# Patient Record
Sex: FEMALE | Race: WHITE | NOT HISPANIC OR LATINO | Employment: FULL TIME | URBAN - METROPOLITAN AREA
[De-identification: names, ages, dates, MRNs, and addresses within clinical notes are randomized per-mention and may not be internally consistent; named-entity substitution may affect disease eponyms.]

---

## 2017-01-16 ENCOUNTER — TRANSFERRED RECORDS (OUTPATIENT)
Dept: HEALTH INFORMATION MANAGEMENT | Facility: CLINIC | Age: 42
End: 2017-01-16

## 2017-01-16 LAB — PAP-ABSTRACT: NORMAL

## 2017-07-15 ENCOUNTER — HEALTH MAINTENANCE LETTER (OUTPATIENT)
Age: 42
End: 2017-07-15

## 2018-01-20 ENCOUNTER — HEALTH MAINTENANCE LETTER (OUTPATIENT)
Age: 43
End: 2018-01-20

## 2018-07-22 ENCOUNTER — HEALTH MAINTENANCE LETTER (OUTPATIENT)
Age: 43
End: 2018-07-22

## 2018-07-23 ENCOUNTER — OFFICE VISIT (OUTPATIENT)
Dept: DERMATOLOGY | Facility: CLINIC | Age: 43
End: 2018-07-23
Payer: COMMERCIAL

## 2018-07-23 VITALS — SYSTOLIC BLOOD PRESSURE: 144 MMHG | DIASTOLIC BLOOD PRESSURE: 74 MMHG | OXYGEN SATURATION: 96 % | HEART RATE: 61 BPM

## 2018-07-23 DIAGNOSIS — L82.1 SEBORRHEIC KERATOSIS: ICD-10-CM

## 2018-07-23 DIAGNOSIS — D22.9 NEVUS: Primary | ICD-10-CM

## 2018-07-23 DIAGNOSIS — L81.4 LENTIGO: ICD-10-CM

## 2018-07-23 DIAGNOSIS — D18.00 ANGIOMA: ICD-10-CM

## 2018-07-23 PROCEDURE — 99203 OFFICE O/P NEW LOW 30 MIN: CPT | Performed by: PHYSICIAN ASSISTANT

## 2018-07-23 NOTE — MR AVS SNAPSHOT
After Visit Summary   7/23/2018    Nila Starks    MRN: [id number]           Patient Information     Date Of Birth          1975        Visit Information        Provider Department      7/23/2018 10:30 AM Diane Castillo PA-C [facility]        Today's Diagnoses     Nevus    -  1    Lentigo        Angioma        Seborrheic keratosis           Follow-ups after your visit        Who to contact     If you have questions or need follow up information about today's clinic visit or your schedule please contact [facility] directly at [phone].  Normal or non-critical lab and imaging results will be communicated to you by MyChart, letter or phone within 4 business days after the clinic has received the results. If you do not hear from us within 7 days, please contact the clinic through MyChart or phone. If you have a critical or abnormal lab result, we will notify you by phone as soon as possible.  Submit refill requests through [employer] or call your pharmacy and they will forward the refill request to us. Please allow 3 business days for your refill to be completed.          Additional Information About Your Visit        Care EveryWhere ID     This is your Care EveryWhere ID. This could be used by other organizations to access your [facility] medical records  [id number]        Your Vitals Were     Pulse Pulse Oximetry                61 96%           Blood Pressure from Last 3 Encounters:   07/23/18 144/74   12/13/07 129/70   02/13/07 110/70    Weight from Last 3 Encounters:   12/13/07 95.3 kg (210 lb)   02/13/07 87.7 kg (193 lb 6.4 oz)   09/07/06 94.8 kg (209 lb)              Today, you had the following     No orders found for display       Primary Care Provider Office Phone # Fax #    Vianey Johana Penaloza [phone]       [address] [place] [address]        Equal Access to Services     JESSIE ELLIOTT : Hoda Funes,  waedisonraeann izaguirre, qaybta kadakotah prieto, andrea lissetsu mcdaniel tanolotus laKatieaahenrry ah. So [facility] [phone].    ATENCIÓN: Si rjla parker, tiene a denton disposición servicios gratuitos de asistencia lingüística. [place] [facility] [phone].    We comply with applicable federal civil rights laws and [place] laws. We do not discriminate on the basis of race, color, national origin, age, disability, sex, sexual orientation, or gender identity.            Thank you!     Thank you for choosing [facility]  for your care. Our goal is always to provide you with excellent care. Hearing back from our patients is one way we can continue to improve our services. Please take a few minutes to complete the written survey that you may receive in the mail after your visit with us. Thank you!             Your Updated Medication List - Protect others around you: Learn how to safely use, store and throw away your medicines at www.disposemymeds.org.          This list is accurate as of 7/23/18  1:53 PM.  Always use your most recent med list.                   Brand Name Dispense Instructions for use Diagnosis    MUCINEX D  MG per 12 hr tablet   Generic drug:  pseudoePHEDrine-guaiFENesin     28    1 TABLET EVERY 12 HOURS AS NEEDED    Acute upper respiratory infections of unspecified site       NO ACTIVE MEDICATIONS     0    .        VITAMIN E BLEND PO      Take 100 Units by mouth

## 2018-07-23 NOTE — LETTER
7/23/2018         RE: Nila Starks  [address]  [facility] [address]        Dear Colleague,    Thank you for referring your patient, Nila Starks, to the [facility]. Please see a copy of my visit note below.    HPI:   Nila Starks is a 43 year old female who presents for Full skin cancer screening.  chief complaint  Last Skin Exam: n/a      1st Baseline: yes  Personal HX of Skin Cancer: no   Personal HX of Malignant Melanoma: no   Family HX of Skin Cancer / Malignant Melanoma: no  Personal HX of Atypical Moles:   no  Risk factors: history of sun exposure and sunburns  New / Changing lesions:yes irritated spot on the back  Social History:   On review of systems, there are no further skin complaints, patient is feeling otherwise well.  See patient intake sheet.  ROS of the following were done and are negative: Constitutional, Eyes, Ears, Nose,   Mouth, Throat, Cardiovascular, Respiratory, GI, Genitourinary, Musculoskeletal,   Psychiatric, Endocrine, Allergic/Immunologic.    PHYSICAL EXAM:   /74  Pulse 61  SpO2 96%  Skin exam performed as follows: Type 2 skin. Mood appropriate  Alert and Oriented X 3. Well developed, well nourished in no distress.  General appearance: Normal  Head including face: Normal  Eyes: conjunctiva and lids: Normal  Mouth: Lips, teeth, gums: Normal  Neck: Normal  Chest-breast/axillae: Normal  Back: Normal  Spleen and liver: Normal  Cardiovascular: Exam of peripheral vascular system by observation for swelling, varicosities, edema: Normal  Genitalia: groin, buttocks: Normal  Extremities: digits/nails (clubbing): Normal  Eccrine and Apocrine glands: Normal  Right upper extremity: Normal  Left upper extremity: Normal  Right lower extremity: Normal  Left lower extremity: Normal  Skin: Scalp and body hair: See below    Pt deferred exam of breasts, groin, buttocks: No    Other physical findings:  1. Multiple pigmented macules on extremities and trunk  2.  Multiple pigmented macules on face, trunk and extremities  3. Multiple vascular papules on trunk, arms and legs  4. Multiple scattered keratotic plaques       Except as noted above, no other signs of skin cancer or melanoma.     ASSESSMENT/PLAN:   Benign Full skin cancer screening today. . Patient with history of none  Advised on monthly self exams and 1 year  Patient Education: Appropriate brochures given.    Multiple benign appearing nevi on arms, legs and trunk. Discussed ABCDEs of melanoma and sunscreen.   Multiple lentigos on arms, legs and trunk. Advised benign, no treatment needed.  Multiple scattered angiomas. Advised benign, no treatment needed.   Seborrheic keratosis on arms, legs and trunk. Advised benign, no treatment needed.  Patient to follow up with Primary Care provider regarding elevated blood pressure.      Follow-up: yearly FSE/PRN sooner    1.) Patient was asked about new and changing moles. YES  2.) Patient received a complete physical skin examination: YES  3.) Patient was counseled to perform a monthly self skin examination: YES  Scribed By: Diane Castillo, MS, PADuranC      Again, thank you for allowing me to participate in the care of your patient.        Sincerely,        Diane Castillo PA-C

## 2018-07-23 NOTE — PROGRESS NOTES
HPI:   Nila Starks is a 43 year old female who presents for Full skin cancer screening.  chief complaint  Last Skin Exam: n/a      1st Baseline: yes  Personal HX of Skin Cancer: no   Personal HX of Malignant Melanoma: no   Family HX of Skin Cancer / Malignant Melanoma: no  Personal HX of Atypical Moles:   no  Risk factors: history of sun exposure and sunburns  New / Changing lesions:yes irritated spot on the back  Social History:   On review of systems, there are no further skin complaints, patient is feeling otherwise well.  See patient intake sheet.  ROS of the following were done and are negative: Constitutional, Eyes, Ears, Nose,   Mouth, Throat, Cardiovascular, Respiratory, GI, Genitourinary, Musculoskeletal,   Psychiatric, Endocrine, Allergic/Immunologic.    PHYSICAL EXAM:   /74  Pulse 61  SpO2 96%  Skin exam performed as follows: Type 2 skin. Mood appropriate  Alert and Oriented X 3. Well developed, well nourished in no distress.  General appearance: Normal  Head including face: Normal  Eyes: conjunctiva and lids: Normal  Mouth: Lips, teeth, gums: Normal  Neck: Normal  Chest-breast/axillae: Normal  Back: Normal  Spleen and liver: Normal  Cardiovascular: Exam of peripheral vascular system by observation for swelling, varicosities, edema: Normal  Genitalia: groin, buttocks: Normal  Extremities: digits/nails (clubbing): Normal  Eccrine and Apocrine glands: Normal  Right upper extremity: Normal  Left upper extremity: Normal  Right lower extremity: Normal  Left lower extremity: Normal  Skin: Scalp and body hair: See below    Pt deferred exam of breasts, groin, buttocks: No    Other physical findings:  1. Multiple pigmented macules on extremities and trunk  2. Multiple pigmented macules on face, trunk and extremities  3. Multiple vascular papules on trunk, arms and legs  4. Multiple scattered keratotic plaques       Except as noted above, no other signs of skin cancer or melanoma.     ASSESSMENT/PLAN:    Benign Full skin cancer screening today. . Patient with history of none  Advised on monthly self exams and 1 year  Patient Education: Appropriate brochures given.    Multiple benign appearing nevi on arms, legs and trunk. Discussed ABCDEs of melanoma and sunscreen.   Multiple lentigos on arms, legs and trunk. Advised benign, no treatment needed.  Multiple scattered angiomas. Advised benign, no treatment needed.   Seborrheic keratosis on arms, legs and trunk. Advised benign, no treatment needed.  Patient to follow up with Primary Care provider regarding elevated blood pressure.      Follow-up: yearly FSE/PRN sooner    1.) Patient was asked about new and changing moles. YES  2.) Patient received a complete physical skin examination: YES  3.) Patient was counseled to perform a monthly self skin examination: YES  Scribed By: Diane Castillo MS, PADuranC

## 2019-01-23 ENCOUNTER — OFFICE VISIT (OUTPATIENT)
Dept: FAMILY MEDICINE | Facility: CLINIC | Age: 44
End: 2019-01-23
Payer: COMMERCIAL

## 2019-01-23 ENCOUNTER — AMBULATORY - HEALTHEAST (OUTPATIENT)
Dept: SURGERY | Facility: CLINIC | Age: 44
End: 2019-01-23

## 2019-01-23 ENCOUNTER — COMMUNICATION - HEALTHEAST (OUTPATIENT)
Dept: SURGERY | Facility: CLINIC | Age: 44
End: 2019-01-23

## 2019-01-23 VITALS
OXYGEN SATURATION: 98 % | BODY MASS INDEX: 44.93 KG/M2 | WEIGHT: 238 LBS | TEMPERATURE: 99.5 F | DIASTOLIC BLOOD PRESSURE: 98 MMHG | HEART RATE: 85 BPM | SYSTOLIC BLOOD PRESSURE: 144 MMHG | HEIGHT: 61 IN | RESPIRATION RATE: 24 BRPM

## 2019-01-23 DIAGNOSIS — K21.9 GASTROESOPHAGEAL REFLUX DISEASE WITHOUT ESOPHAGITIS: ICD-10-CM

## 2019-01-23 DIAGNOSIS — R03.0 ELEVATED BLOOD PRESSURE READING WITHOUT DIAGNOSIS OF HYPERTENSION: ICD-10-CM

## 2019-01-23 DIAGNOSIS — E66.01 MORBID OBESITY (H): ICD-10-CM

## 2019-01-23 DIAGNOSIS — Z00.00 ROUTINE GENERAL MEDICAL EXAMINATION AT A HEALTH CARE FACILITY: Primary | ICD-10-CM

## 2019-01-23 DIAGNOSIS — R20.2 NUMBNESS AND TINGLING: ICD-10-CM

## 2019-01-23 DIAGNOSIS — R20.0 NUMBNESS AND TINGLING: ICD-10-CM

## 2019-01-23 DIAGNOSIS — G47.33 OSA (OBSTRUCTIVE SLEEP APNEA): ICD-10-CM

## 2019-01-23 DIAGNOSIS — R07.9 CHEST PAIN, UNSPECIFIED TYPE: ICD-10-CM

## 2019-01-23 LAB
CHOLEST SERPL-MCNC: 132 MG/DL
DEPRECATED CALCIDIOL+CALCIFEROL SERPL-MC: 9 UG/L (ref 20–75)
GLUCOSE SERPL-MCNC: 89 MG/DL (ref 70–99)
HBA1C MFR BLD: 5.6 % (ref 0–5.6)
HDLC SERPL-MCNC: 27 MG/DL
LDLC SERPL CALC-MCNC: 57 MG/DL
NONHDLC SERPL-MCNC: 105 MG/DL
TRIGL SERPL-MCNC: 238 MG/DL
TSH SERPL DL<=0.005 MIU/L-ACNC: 1.8 MU/L (ref 0.4–4)
VIT B12 SERPL-MCNC: 532 PG/ML (ref 193–986)

## 2019-01-23 PROCEDURE — 99386 PREV VISIT NEW AGE 40-64: CPT | Performed by: NURSE PRACTITIONER

## 2019-01-23 PROCEDURE — 82607 VITAMIN B-12: CPT | Performed by: NURSE PRACTITIONER

## 2019-01-23 PROCEDURE — 93000 ELECTROCARDIOGRAM COMPLETE: CPT | Performed by: NURSE PRACTITIONER

## 2019-01-23 PROCEDURE — 83036 HEMOGLOBIN GLYCOSYLATED A1C: CPT | Performed by: NURSE PRACTITIONER

## 2019-01-23 PROCEDURE — 84443 ASSAY THYROID STIM HORMONE: CPT | Performed by: NURSE PRACTITIONER

## 2019-01-23 PROCEDURE — 80061 LIPID PANEL: CPT | Performed by: NURSE PRACTITIONER

## 2019-01-23 PROCEDURE — 99214 OFFICE O/P EST MOD 30 MIN: CPT | Mod: 25 | Performed by: NURSE PRACTITIONER

## 2019-01-23 PROCEDURE — 36415 COLL VENOUS BLD VENIPUNCTURE: CPT | Performed by: NURSE PRACTITIONER

## 2019-01-23 PROCEDURE — 82306 VITAMIN D 25 HYDROXY: CPT | Performed by: NURSE PRACTITIONER

## 2019-01-23 PROCEDURE — 82947 ASSAY GLUCOSE BLOOD QUANT: CPT | Performed by: NURSE PRACTITIONER

## 2019-01-23 ASSESSMENT — MIFFLIN-ST. JEOR: SCORE: 1654

## 2019-01-23 NOTE — PATIENT INSTRUCTIONS
For acid reflux:  May take ranitidine 150 mg 1-2 times daily  1. Avoid eating within 3-4 hours of bedtime.    2. Eat frequent small meals per day rather than large meals.    3. Avoid tobacco and alcohol products, avoid tight fitting clothes, elevate head of bed with six inch blocks.  4. Take antacids, like TUMS, for occasional heart burn.    5. Avoid NSAIDS.  6. If overweight, weight loss is recommended. Losing even as little as 10 lbs may decrease symptoms.  7. Avoid high fat meals and other foods that aggravate the problem. Foods that may cause more symptoms are: chocolate, tomato-based foods, alcohol, peppermint, caffeinated products, citrus fruits and drinks, onions and garlic.          Preventive Health Recommendations  Female Ages 40 to 49    Yearly exam:     See your health care provider every year in order to  1. Review health changes.   2. Discuss preventive care.    3. Review your medicines if your doctor prescribed any.      Get a Pap test every three years (unless you have an abnormal result and your provider advises testing more often).      If you get Pap tests with HPV test, you only need to test every 5 years, unless you have an abnormal result. You do not need a Pap test if your uterus was removed (hysterectomy) and you have not had cancer.      You should be tested each year for STDs (sexually transmitted diseases), if you're at risk.     Ask your doctor if you should have a mammogram.      Have a colonoscopy (test for colon cancer) if someone in your family has had colon cancer or polyps before age 50.       Have a cholesterol test every 5 years.       Have a diabetes test (fasting glucose) after age 45. If you are at risk for diabetes, you should have this test every 3 years.    Shots: Get a flu shot each year. Get a tetanus shot every 10 years.     Nutrition:     Eat at least 5 servings of fruits and vegetables each day.    Eat whole-grain bread, whole-wheat pasta and brown rice instead of white  grains and rice.    Get adequate Calcium and Vitamin D.      Lifestyle    Exercise at least 150 minutes a week (an average of 30 minutes a day, 5 days a week). This will help you control your weight and prevent disease.    Limit alcohol to one drink per day.    No smoking.     Wear sunscreen to prevent skin cancer.    See your dentist every six months for an exam and cleaning.

## 2019-01-23 NOTE — PROGRESS NOTES
"   SUBJECTIVE:   CC: Nila Starks is an 43 year old woman who presents for preventive health visit.     Healthy Habits:    Do you get at least three servings of calcium containing foods daily (dairy, green leafy vegetables, etc.)? Probably 2    Amount of exercise or daily activities, outside of work: none    Problems taking medications regularly not applicable    Medication side effects: No    Have you had an eye exam in the past two years? yes    Do you see a dentist twice per year? yes    Do you have sleep apnea, excessive snoring or daytime drowsiness?yes-  Doesn't use a cpap, didn't work for her. Needs to be re-evaluated        Pap smear done on this date: 2017  (approximately), by this group: [facility]- see [facility] records, results were normal per patient        Tingling in feet and hands  Numbness at times  Occurring for 2 years  On and off - she isn't sure what causes it.      Gurgling/ bubbling feeling in her chest  Burning in chest  Wakes at night with heart racing  Feels like heart does summersaults at times  Tight feeling  Sometimes feels like food gets stuck in her chest-\" kind of like heartburn\"  Happening for a few months.    Obesity:  Wants to work on weight loss.  Asking for referral.    Today's PHQ-2 Score:   PHQ-2 ( 1999 Pfizer) 1/23/2019   Q1: Little interest or pleasure in doing things 0   Q2: Feeling down, depressed or hopeless 0   PHQ-2 Score 0       Abuse: Current or Past(Physical, Sexual or Emotional)- No  Do you feel safe in your environment? Yes    Social History     Tobacco Use     Smoking status: Never Smoker     Smokeless tobacco: Never Used   Substance Use Topics     Alcohol use: No     If you drink alcohol do you typically have >3 drinks per day or >7 drinks per week? No                     Reviewed orders with patient.  Reviewed health maintenance and updated orders accordingly - Yes        Pertinent mammograms are reviewed under the imaging tab.    History of abnormal Pap " "smear: YES - updated in Problem List and Health Maintenance accordingly  PAP / HPV 2/13/2007 9/7/2006 12/30/2005   PAP NIL ASC-US(A) NIL     Reviewed and updated as needed this visit by clinical staff  Tobacco  Allergies  Meds  Problems  Med Hx  Surg Hx  Fam Hx  Soc Hx          Reviewed and updated as needed this visit by Provider  Problems            ROS:   CONSTITUTIONAL: NEGATIVE for fever, chills, change in weight  INTEGUMENTARU/SKIN: NEGATIVE for worrisome rashes, moles or lesions  EYES: NEGATIVE for vision changes or irritation  ENT: NEGATIVE for ear, mouth and throat problems  RESP: NEGATIVE for significant cough or SOB  BREAST: NEGATIVE for masses, tenderness or discharge  CV: NEGATIVE for chest pain, palpitations or peripheral edema  GI: NEGATIVE for nausea, abdominal pain, heartburn, or change in bowel habits  : NEGATIVE for unusual urinary or vaginal symptoms. Periods are regular.  MUSCULOSKELETAL: NEGATIVE for significant arthralgias or myalgia  NEURO: NEGATIVE for weakness, dizziness or paresthesias  PSYCHIATRIC: NEGATIVE for changes in mood or affect    OBJECTIVE:   BP (!) 144/98 (BP Location: Right arm)   Pulse 85   Temp 99.5  F (37.5  C) (Tympanic)   Resp 24   Ht 1.537 m (5' 0.5\")   Wt 108 kg (238 lb)   LMP 12/31/2018 (Exact Date)   SpO2 98%   Breastfeeding? No   BMI 45.72 kg/m    EXAM:  GENERAL: healthy, alert and no distress  EYES: Eyes grossly normal to inspection, PERRL and conjunctivae and sclerae normal  HENT: ear canals and TM's normal, nose and mouth without ulcers or lesions  NECK: no adenopathy, no asymmetry, masses, or scars and thyroid normal to palpation  RESP: lungs clear to auscultation - no rales, rhonchi or wheezes  BREAST: normal without masses, tenderness or nipple discharge and no palpable axillary masses or adenopathy  CV: regular rate and rhythm, normal S1 S2, no S3 or S4, no murmur, click or rub, no peripheral edema and peripheral pulses strong  ABDOMEN: " soft, nontender, no hepatosplenomegaly, no masses and bowel sounds normal   (female): patient declined  MS: no gross musculoskeletal defects noted, no edema  SKIN: no suspicious lesions or rashes  NEURO: Normal strength and tone, mentation intact and speech normal  PSYCH: mentation appears normal, affect normal/bright    Diagnostic Test Results:    EKG - appears normal, NSR, normal axis, normal intervals, no acute ST/T changes c/w ischemia, no LVH by voltage criteria, unchanged from previous tracings    ASSESSMENT/PLAN:       ICD-10-CM    1. Routine general medical examination at a health care facility Z00.00 GLUCOSE     Lipid panel reflex to direct LDL Fasting     Hemoglobin A1c     2. Morbid obesity (H) E66.01 BARIATRIC ADULT REFERRAL     OFFICE/OUTPT VISIT,EST,LEVL III     3. Numbness and tingling R20.0 TSH with free T4 reflex    R20.2 Vitamin B12     Vitamin D Deficiency     OFFICE/OUTPT VISIT,EST,LEVL III     4. Gastroesophageal reflux disease without esophagitis K21.9 May take ranitidine 150 mg 1-2 times daily  1. Avoid eating within 3-4 hours of bedtime.    2. Eat frequent small meals per day rather than large meals.    3. Avoid tobacco and alcohol products, avoid tight fitting clothes, elevate head of bed with six inch blocks.  4. Take antacids, like TUMS, for occasional heart burn.    5. Avoid NSAIDS.  6. If overweight, weight loss is recommended. Losing even as little as 10 lbs may decrease symptoms.  7. Avoid high fat meals and other foods that aggravate the problem. Foods that may cause more symptoms are: chocolate, tomato-based foods, alcohol, peppermint, caffeinated products, citrus fruits and drinks, onions and garlic.  OFFICE/OUTPT VISIT,EST,LEVL III     5. HIEN (obstructive sleep apnea) G47.33 SLEEP EVALUATION & MANAGEMENT REFERRAL - [facility]  [phone] (Age 2 and up)     OFFICE/OUTPT VISIT,EST,LEVL III     6. Chest pain, unspecified type R07.9 EKG  "normal.  Symptoms are likely GERD related.    EKG 12-lead complete w/read - Clinics     OFFICE/OUTPT VISIT,EST,LEVL III     7. Elevated blood pressure reading without diagnosis of hypertension R03.0 Advised to return for RN recheck in 2 weeks.       COUNSELING:   Reviewed preventive health counseling, as reflected in patient instructions    BP Readings from Last 1 Encounters:   01/23/19 (!) 144/98     Estimated body mass index is 45.72 kg/m  as calculated from the following:    Height as of this encounter: 1.537 m (5' 0.5\").    Weight as of this encounter: 108 kg (238 lb).      Weight management plan: Discussed healthy diet and exercise guidelines     reports that  has never smoked. she has never used smokeless tobacco.        The risks, benefits and treatment options of prescribed medications or other treatments have been discussed with the patient. The patient verbalized their understanding and should call or follow up if no improvement or if they develop further problems.    KENYA Merchant [facility]  "

## 2019-01-23 NOTE — LETTER
January 24, 2019      Nila Starks  [address] [place] [address]        Dear ,    We are writing to inform you of your test results.  Your diabetes test was negative.  Vitamin B 12 was normal.  Thyroid is normal.    Your triglycerides are high. Aerobic exercise will help lower this, as well as weight loss. You should follow a low fat diet, limiting refined sugars, fruit juices, and high fructose beverages; we suggest increased consumption of fish that contain high amounts of omega-3 fatty acids.    Your HDL cholesterol (good cholesterol) is low. HDL helps your body get rid of the bad cholesterol and is cardio-protective. Exercise helps increase your HDL.      Your Vitamin D levels are low. Recommend taking 1000 IUs of Vitamin D3 daily - you may buy this over-the-counter.  Gina Gonzalez, DEMETRA      Resulted Orders   GLUCOSE   Result Value Ref Range    Glucose 89 70 - 99 mg/dL      Comment:      Fasting specimen   Lipid panel reflex to direct LDL Fasting   Result Value Ref Range    Cholesterol 132 <200 mg/dL    Triglycerides 238 (H) <150 mg/dL      Comment:      Borderline high:  150-199 mg/dl  High:             200-499 mg/dl  Very high:       >499 mg/dl  Fasting specimen      HDL Cholesterol 27 (L) >49 mg/dL    LDL Cholesterol Calculated 57 <100 mg/dL      Comment:      Desirable:       <100 mg/dl    Non HDL Cholesterol 105 <130 mg/dL   Hemoglobin A1c   Result Value Ref Range    Hemoglobin A1C 5.6 0 - 5.6 %      Comment:      Normal <5.7% Prediabetes 5.7-6.4%  Diabetes 6.5% or higher - adopted from ADA   consensus guidelines.     TSH with free T4 reflex   Result Value Ref Range    TSH 1.80 0.40 - 4.00 mU/L   Vitamin B12   Result Value Ref Range    Vitamin B12 532 193 - 986 pg/mL   Vitamin D Deficiency   Result Value Ref Range    Vitamin D Deficiency screening 9 (L) 20 - 75 ug/L      Comment:      Season, race, dietary intake, and treatment affect the concentration of   25-hydroxy-Vitamin  D. Values may decrease during winter months and increase   during summer months. Values 20-29 ug/L may indicate Vitamin D insufficiency   and values <20 ug/L may indicate Vitamin D deficiency.  Vitamin D determination is routinely performed by an immunoassay specific for   25 hydroxyvitamin D3.  If an individual is on vitamin D2 (ergocalciferol)   supplementation, please specify 25 OH vitamin D2 and D3 level determination by   LCMSMS test VITD23.         If you have any questions or concerns, please call the clinic at the number listed above.       Sincerely,        KENYA Merchant CNP/sbl

## 2019-01-23 NOTE — Clinical Note
Pap smear done on this date: 2017  (approximately), by this group: [facility] results were normal per patient

## 2019-03-06 ENCOUNTER — AMBULATORY - HEALTHEAST (OUTPATIENT)
Dept: LAB | Facility: CLINIC | Age: 44
End: 2019-03-06

## 2019-03-06 ENCOUNTER — OFFICE VISIT - HEALTHEAST (OUTPATIENT)
Dept: SURGERY | Facility: CLINIC | Age: 44
End: 2019-03-06

## 2019-03-06 ENCOUNTER — AMBULATORY - HEALTHEAST (OUTPATIENT)
Dept: SURGERY | Facility: CLINIC | Age: 44
End: 2019-03-06

## 2019-03-06 ENCOUNTER — TRANSFERRED RECORDS (OUTPATIENT)
Dept: HEALTH INFORMATION MANAGEMENT | Facility: CLINIC | Age: 44
End: 2019-03-06

## 2019-03-06 DIAGNOSIS — R79.89 LOW VITAMIN D LEVEL: ICD-10-CM

## 2019-03-06 DIAGNOSIS — E66.01 OBESITY, CLASS III, BMI 40-49.9 (MORBID OBESITY) (H): ICD-10-CM

## 2019-03-06 LAB
ALBUMIN SERPL-MCNC: 3.7 G/DL (ref 3.5–5)
ALP SERPL-CCNC: 80 U/L (ref 45–120)
ALT SERPL W P-5'-P-CCNC: <9 U/L (ref 0–45)
ANION GAP SERPL CALCULATED.3IONS-SCNC: 8 MMOL/L (ref 5–18)
AST SERPL W P-5'-P-CCNC: 13 U/L (ref 0–40)
BILIRUB SERPL-MCNC: 0.3 MG/DL (ref 0–1)
BUN SERPL-MCNC: 12 MG/DL (ref 8–22)
CALCIUM SERPL-MCNC: 9 MG/DL (ref 8.5–10.5)
CHLORIDE BLD-SCNC: 107 MMOL/L (ref 98–107)
CO2 SERPL-SCNC: 26 MMOL/L (ref 22–31)
CREAT SERPL-MCNC: 0.69 MG/DL (ref 0.6–1.1)
GFR SERPL CREATININE-BSD FRML MDRD: >60 ML/MIN/1.73M2
GLUCOSE BLD-MCNC: 87 MG/DL (ref 70–125)
POTASSIUM BLD-SCNC: 4.4 MMOL/L (ref 3.5–5)
PROT SERPL-MCNC: 6.8 G/DL (ref 6–8)
SODIUM SERPL-SCNC: 141 MMOL/L (ref 136–145)

## 2019-03-06 ASSESSMENT — MIFFLIN-ST. JEOR: SCORE: 1665.08

## 2019-03-07 ENCOUNTER — COMMUNICATION - HEALTHEAST (OUTPATIENT)
Dept: SURGERY | Facility: CLINIC | Age: 44
End: 2019-03-07

## 2019-04-29 ENCOUNTER — OFFICE VISIT (OUTPATIENT)
Dept: SLEEP MEDICINE | Facility: CLINIC | Age: 44
End: 2019-04-29
Payer: COMMERCIAL

## 2019-04-29 VITALS
HEIGHT: 61 IN | WEIGHT: 242 LBS | HEART RATE: 68 BPM | BODY MASS INDEX: 45.69 KG/M2 | OXYGEN SATURATION: 95 % | DIASTOLIC BLOOD PRESSURE: 80 MMHG | SYSTOLIC BLOOD PRESSURE: 138 MMHG

## 2019-04-29 DIAGNOSIS — G47.33 OSA (OBSTRUCTIVE SLEEP APNEA): ICD-10-CM

## 2019-04-29 DIAGNOSIS — E66.01 CLASS 3 SEVERE OBESITY DUE TO EXCESS CALORIES WITH BODY MASS INDEX (BMI) OF 45.0 TO 49.9 IN ADULT, UNSPECIFIED WHETHER SERIOUS COMORBIDITY PRESENT (H): Primary | ICD-10-CM

## 2019-04-29 DIAGNOSIS — E66.813 CLASS 3 SEVERE OBESITY DUE TO EXCESS CALORIES WITH BODY MASS INDEX (BMI) OF 45.0 TO 49.9 IN ADULT, UNSPECIFIED WHETHER SERIOUS COMORBIDITY PRESENT (H): Primary | ICD-10-CM

## 2019-04-29 PROCEDURE — 99205 OFFICE O/P NEW HI 60 MIN: CPT | Performed by: FAMILY MEDICINE

## 2019-04-29 ASSESSMENT — MIFFLIN-ST. JEOR: SCORE: 1667.14

## 2019-04-29 NOTE — PROGRESS NOTES
"  Sleep Consultation:    Date on this visit: 4/29/2019    Nila Starks is sent by Gina Gonzalez for a sleep consultation regarding history of HIEN and to discuss treatment options.    Primary Physician: Vianey Sow     Chief Complaint: \"I needed a new sleep study in order to get a mouth guard for sleep apnea.\"    HPI:  Nila Starks is a 43 yo F with history of morbid obesity, GERD, HIEN who presents for repeat sleep testing.    I was able to get partial results from her prior PSG performed at [facility] on 4/8/2005 with undocumented weight, RDI 59, jose enrique SpO2 88%.  CPAP at 8 effective lateral and 13 cm H2O supine.  CPAP was reportedly poorly tolerated.    She returns today in order to get an updated sleep study so she can pursue a mandibular advancement device for HIEN.  Was told her study was over 10 years old, so needed to be repeated.  I suspect it was due to an incomplete report and that original data had been purged.    She believes she has gained weight relative to her PSG.  Is having ongoing snoring and observed apnea, along with daytime fatigue and frequent (though brief) night-time awakenings.  She had also considered bariatric surgery, but is an exclusion on her insurance.    She states that CPAP was \"not for her\" and tried it \"a few times\".  It sounds like the trials were shortly after her sleep study.  Her barriers to usage are vague to me today.  We discussed the improvements in PAP therapy, but she is unwilling to reconsider PAP therapy today.    Nila denies any sleep walking and dream enactment.    Patient's [place] Sleepiness score 5/24 consistent with no daytime sleepiness.    On work days, in bed around [place] and usually asleep within 30 minutes.  Will awaken 3-4+ times, seemingly every 2 hours, will fall back asleep quickly.  Up at 7am.    Weekends, 12:30am - 9:30am.  May take 1 nap on weekends for 1-2 hours.    Normal CO2 on recent metabolic panel.    Allergies:  "   Allergies   Allergen Reactions     Penicillins Rash       Medications:    Current Outpatient Medications   Medication Sig Dispense Refill     VITAMIN D, CHOLECALCIFEROL, PO Take 5,000 Units by mouth daily       VITAMIN E BLEND PO Take 100 Units by mouth daily as needed        NO ACTIVE MEDICATIONS . 0 0       Problem List:  Patient Active Problem List    Diagnosis Date Noted     Morbid obesity (H) 2019     Priority: Medium     HIEN (obstructive sleep apnea) 2019     Priority: Medium     Gastroesophageal reflux disease without esophagitis 2019     Priority: Medium     Numbness and tingling 2019     Priority: Medium     Hypertrophy of tonsils alone 12/15/2007     Priority: Medium        Past Medical/Surgical History:  Past Medical History:   Diagnosis Date     Abnormal maternal glucose tolerance, complicating pregnancy, childbirth, or the puerperium, unspecified as to episode of care      Unspecified sleep apnea      Past Surgical History:   Procedure Laterality Date     C  DELIVERY ONLY  3/98, ,      C ORAL SURGERY PROCEDURE       HC DILATION/CURETTAGE DIAG/THER NON OB  2002    Missed Ab       Social History:  Social History     Socioeconomic History     Marital status:      Spouse name: Not on file     Number of children: 4     Years of education: Not on file     Highest education level: Not on file   Occupational History     Occupation:  Provider     Employer: SELF   Social Needs     Financial resource strain: Not on file     Food insecurity:     Worry: Not on file     Inability: Not on file     Transportation needs:     Medical: Not on file     Non-medical: Not on file   Tobacco Use     Smoking status: Never Smoker     Smokeless tobacco: Never Used   Substance and Sexual Activity     Alcohol use: No     Drug use: No     Sexual activity: Not Currently     Partners: Male     Birth control/protection: Condom   Lifestyle     Physical activity:     Days per week:  "Not on file     Minutes per session: Not on file     Stress: Not on file   Relationships     Social connections:     Talks on phone: Not on file     Gets together: Not on file     Attends Yazidi service: Not on file     Active member of club or organization: Not on file     Attends meetings of clubs or organizations: Not on file     Relationship status: Not on file     Intimate partner violence:     Fear of current or ex partner: Not on file     Emotionally abused: Not on file     Physically abused: Not on file     Forced sexual activity: Not on file   Other Topics Concern     Not on file   Social History Narrative     Not on file       Family History:  Family History   Problem Relation Age of Onset     Diabetes Mother         type 2     Diabetes Father         type 2     Hypertension Father      Prostate Cancer Father      Skin Cancer Sister      Diabetes Sister        Review of Systems:  A complete review of systems reviewed by me is negative with the exeption of what has been mentioned in the history of present illness.    Physical Examination:  Vitals: /80   Pulse 68   Ht 1.537 m (5' 0.5\")   Wt 109.8 kg (242 lb)   SpO2 95%   BMI 46.48 kg/m    BMI= Body mass index is 46.48 kg/m .    Neck Cir (cm): 41 cm    Stirum Total Score 4/29/2019   Total score - Stirum 5       MONROE Total Score: 18 (04/29/19 1000)    GENERAL APPEARANCE: healthy, alert, no distress and over weight  RESP: lungs clear to auscultation - no rales, rhonchi or wheezes  CV: regular rates and rhythm, normal S1 S2, no S3 or S4 and no murmur, click or rub  PSYCH: mentation appears normal and affect flat      Impression/Plan:    Nila Starks is a 43 yo F with history of morbid obesity, GERD, HIEN who presents for repeat sleep testing.    1.)  History of severe HIEN  - PSG on 4/8/2005 at [facility] with unknown weight, RDI ~60, jose enrique SpO2 88%.  - Reportedly poor experience with PAP therapy.  - Patient is clear that she does not " want to retry PAP therapy.  - We discussed that there is potential her HIEN has worsened given reported interim weight gain and that is AHI is truly 60+, then a mandibular advancement device has a low likelihood of being highly effective.  Attempted to discuss how PAP therapy has improved.  - No evidence of metabolic compensation for respiratory acidosis on recent BMP.  So elected to not check baseline blood gases.    Plan as given to patient as above.    I have spent 60 minutes with this patient today in which greater than 50% of this time was spent in the counseling / coordination of care regarding HIEN.    Over 50% of our time was spent in coordination and counseling of care for obstructive sleep apnea (HIEN).  We reviewed pathophysiology of HIEN, prior sleep testing if performed, indication for sleep testing, importance of treatment of significant HIEN to reduce long-term cardiovascular risk factors, importance of weight management, and proper care of equipment.    Nirav Ramos MD    CC: Gina Gonzalez

## 2019-04-29 NOTE — NURSING NOTE
"Chief Complaint   Patient presents with     Sleep Problem       Initial /80   Pulse 68   Ht 1.537 m (5' 0.5\")   Wt 109.8 kg (242 lb)   SpO2 95%   BMI 46.48 kg/m   Estimated body mass index is 46.48 kg/m  as calculated from the following:    Height as of this encounter: 1.537 m (5' 0.5\").    Weight as of this encounter: 109.8 kg (242 lb).    Medication Reconciliation: complete    Neck circumference: 16 inches / 40.5 centimeters.      "

## 2019-04-29 NOTE — PATIENT INSTRUCTIONS
Your BMI is Body mass index is 46.48 kg/m .  Weight management is a personal decision.  If you are interested in exploring weight loss strategies, the following discussion covers the approaches that may be successful. Body mass index (BMI) is one way to tell whether you are at a healthy weight, overweight, or obese. It measures your weight in relation to your height.  A BMI of 18.5 to 24.9 is in the healthy range. A person with a BMI of 25 to 29.9 is considered overweight, and someone with a BMI of 30 or greater is considered obese. More than two-thirds of American adults are considered overweight or obese.  Being overweight or obese increases the risk for further weight gain. Excess weight may lead to heart disease and diabetes.  Creating and following plans for healthy eating and physical activity may help you improve your health.  Weight control is part of healthy lifestyle and includes exercise, emotional health, and healthy eating habits. Careful eating habits lifelong are the mainstay of weight control. Though there are significant health benefits from weight loss, long-term weight loss with diet alone may be very difficult to achieve- studies show long-term success with dietary management in less than 10% of people. Attaining a healthy weight may be especially difficult to achieve in those with severe obesity. In some cases, medications, devices and surgical management might be considered.  What can you do?  If you are overweight or obese and are interested in methods for weight loss, you should discuss this with your provider.     Consider reducing daily calorie intake by 500 calories.     Keep a food journal.     Avoiding skipping meals, consider cutting portions instead.    Diet combined with exercise helps maintain muscle while optimizing fat loss. Strength training is particularly important for building and maintaining muscle mass. Exercise helps reduce stress, increase energy, and improves fitness.  Increasing exercise without diet control, however, may not burn enough calories to loose weight.       Start walking three days a week 10-20 minutes at a time    Work towards walking thirty minutes five days a week     Eventually, increase the speed of your walking for 1-2 minutes at time    In addition, we recommend that you review healthy lifestyles and methods for weight loss available through the National Institutes of Health patient information sites:  http://win.niddk.nih.gov/publications/index.htm    And look into health and wellness programs that may be available through your health insurance provider, employer, local community center, or corbin club.    Weight management plan: Patient was referred to their PCP to discuss a diet and exercise plan.

## 2019-05-11 LAB — URINE CULTURE, ROUTINE: NORMAL

## 2019-06-11 ENCOUNTER — OFFICE VISIT (OUTPATIENT)
Dept: FAMILY MEDICINE | Facility: CLINIC | Age: 44
End: 2019-06-11
Payer: COMMERCIAL

## 2019-06-11 ENCOUNTER — THERAPY VISIT (OUTPATIENT)
Dept: SLEEP MEDICINE | Facility: CLINIC | Age: 44
End: 2019-06-11
Payer: COMMERCIAL

## 2019-06-11 VITALS
SYSTOLIC BLOOD PRESSURE: 138 MMHG | BODY MASS INDEX: 46.44 KG/M2 | TEMPERATURE: 97.8 F | WEIGHT: 246 LBS | DIASTOLIC BLOOD PRESSURE: 86 MMHG | HEIGHT: 61 IN | HEART RATE: 68 BPM | OXYGEN SATURATION: 97 %

## 2019-06-11 DIAGNOSIS — E66.01 CLASS 3 SEVERE OBESITY DUE TO EXCESS CALORIES WITH BODY MASS INDEX (BMI) OF 45.0 TO 49.9 IN ADULT, UNSPECIFIED WHETHER SERIOUS COMORBIDITY PRESENT (H): ICD-10-CM

## 2019-06-11 DIAGNOSIS — G47.33 OSA (OBSTRUCTIVE SLEEP APNEA): ICD-10-CM

## 2019-06-11 DIAGNOSIS — E66.813 CLASS 3 SEVERE OBESITY DUE TO EXCESS CALORIES WITH BODY MASS INDEX (BMI) OF 45.0 TO 49.9 IN ADULT, UNSPECIFIED WHETHER SERIOUS COMORBIDITY PRESENT (H): ICD-10-CM

## 2019-06-11 DIAGNOSIS — N94.10 COITUS PAINFUL FOR FEMALE: Primary | ICD-10-CM

## 2019-06-11 DIAGNOSIS — R42 DIZZINESS: ICD-10-CM

## 2019-06-11 PROCEDURE — 95810 POLYSOM 6/> YRS 4/> PARAM: CPT | Performed by: FAMILY MEDICINE

## 2019-06-11 PROCEDURE — 99214 OFFICE O/P EST MOD 30 MIN: CPT | Performed by: NURSE PRACTITIONER

## 2019-06-11 RX ORDER — MECLIZINE HYDROCHLORIDE 25 MG/1
25 TABLET ORAL 3 TIMES DAILY PRN
Qty: 21 TABLET | Refills: 1 | Status: SHIPPED | OUTPATIENT
Start: 2019-06-11 | End: 2020-08-25

## 2019-06-11 ASSESSMENT — MIFFLIN-ST. JEOR: SCORE: 1685.29

## 2019-06-11 NOTE — PATIENT INSTRUCTIONS
Patient Education     Managing Dizziness (Vertigo) with Medicines    Although medicines can't cure your problem, they can help control symptoms. Your doctor may prescribe medicines for a few weeks and then taper them off. Always take your medicine as prescribed. Never share your medicine with others.  Contact your healthcare provider right away if you have side effects from your medicines.   How medicines can help    Treat infection or inflammation. If you have an infection caused by bacteria, your doctor can prescribe antibiotics.    Limit conflicting balance signals. These medicines are often in pill form.    Ease nausea. Suppositories, pills, or shots can reduce vomiting.    Reduce pressure in the canals. Diuretics can be used to treat Meniere's disease. These medicines help your body get rid of extra fluid.    Ease other symptoms. Other medicines can help ease depression and anxiety caused by living with dizziness or fainting.  Date Last Reviewed: 11/1/2016 2000-2018 [employer]. [address] [place] [address], [place] [address]. All rights reserved. This information is not intended as a substitute for professional medical care. Always follow your healthcare professional's instructions.

## 2019-06-11 NOTE — NURSING NOTE
"Initial /86   Pulse 68   Temp 97.8  F (36.6  C) (Tympanic)   Ht 1.537 m (5' 0.5\")   Wt 111.6 kg (246 lb)   LMP 05/12/2019 (Exact Date)   SpO2 97%   BMI 47.25 kg/m   Estimated body mass index is 47.25 kg/m  as calculated from the following:    Height as of this encounter: 1.537 m (5' 0.5\").    Weight as of this encounter: 111.6 kg (246 lb). .    Cleo Sue CMA ([facility])  "

## 2019-06-11 NOTE — PROGRESS NOTES
Subjective     Nila Starks is a 44 year old female who presents to clinic today for the following health issues:    HPI   Dizziness  Onset: 4 weeks - worse at night.     Description:   Do you feel faint:  no   Does it feel like the surroundings (bed, room) are moving: YES  Unsteady/off balance: no   Have you passed out or fallen: no     Intensity: mild    Progression of Symptoms:  same    Accompanying Signs & Symptoms:  Heart palpitations: no   Nausea, vomiting: no   Weakness in arms or legs: no   Fatigue: no - she is having a sleep study done tonight.   Vision or speech changes: none other than year eye exam   Ringing in ears (Tinnitus): no   Hearing Loss: no     History:   Head trauma/concussion hx: no   Previous similar symptoms: no   Recent bleeding history: no - but her menstrual cycle has been irregular.   LMP 2019 - occurs a few days sooner or later or sometimes will get 2 cycles in a month.  Reports some heavy periods the first 2-3 days.  No cramping.    Precipitating factors:   Worse with activity or head movement: no   Any new medications (BP?): no   Alcohol/drug abuse/withdrawal: no     Alleviating factors:   Does staying in a fixed position give relief:  no     Therapies Tried and outcome: none     Noticing it more when going from left to right side.  No recent colds or reports of allergies.    No symptoms reported throughout the day just at night.    Also reporting pain during intercourse - stopped having intercourse with  for 1 year.  Reports lower abdominal/groin pain bilateral.  No post coital bleeding reported.  Recently tried to use a tampon and had difficulty placing this.      Patient Active Problem List   Diagnosis     Hypertrophy of tonsils alone     Morbid obesity (H)     HIEN (obstructive sleep apnea)     Gastroesophageal reflux disease without esophagitis     Numbness and tingling     Past Surgical History:   Procedure Laterality Date     C  DELIVERY ONLY  3/98, ,  "4/03     C ORAL SURGERY PROCEDURE       HC DILATION/CURETTAGE DIAG/THER NON OB  6/2002    Missed Ab       Social History     Tobacco Use     Smoking status: Never Smoker     Smokeless tobacco: Never Used   Substance Use Topics     Alcohol use: No     Family History   Problem Relation Age of Onset     Diabetes Mother         type 2     Diabetes Father         type 2     Hypertension Father      Prostate Cancer Father      Prostate Problems Father      Skin Cancer Sister      Diabetes Sister          Current Outpatient Medications   Medication Sig Dispense Refill     meclizine (ANTIVERT) 25 MG tablet Take 1 tablet (25 mg) by mouth 3 times daily as needed for dizziness (vertigo) 21 tablet 1     NO ACTIVE MEDICATIONS . 0 0     VITAMIN D, CHOLECALCIFEROL, PO Take 5,000 Units by mouth daily       VITAMIN E BLEND PO Take 100 Units by mouth daily as needed        Allergies   Allergen Reactions     Penicillins Rash     Recent Labs   Lab Test 01/23/19  1140   A1C 5.6   LDL 57   HDL 27*   TRIG 238*   TSH 1.80      BP Readings from Last 3 Encounters:   06/11/19 138/86   04/29/19 138/80   01/23/19 (!) 144/98    Wt Readings from Last 3 Encounters:   06/11/19 111.6 kg (246 lb)   04/29/19 109.8 kg (242 lb)   01/23/19 108 kg (238 lb)                    Reviewed and updated as needed this visit by Provider  Tobacco  Allergies  Meds  Problems  Med Hx  Surg Hx  Fam Hx         Review of Systems   ROS COMP: Constitutional, HEENT, cardiovascular, pulmonary, GI, , musculoskeletal, neuro, skin, endocrine and psych systems are negative, except as otherwise noted.      Objective    /86   Pulse 68   Temp 97.8  F (36.6  C) (Tympanic)   Ht 1.537 m (5' 0.5\")   Wt 111.6 kg (246 lb)   LMP 05/12/2019 (Exact Date)   SpO2 97%   BMI 47.25 kg/m    Body mass index is 47.25 kg/m .  Physical Exam   GENERAL: alert, no distress and over weight  EYES: Eyes grossly normal to inspection, PERRL and conjunctivae and sclerae normal  HENT: ear " canals and TM's normal, nose and mouth without ulcers or lesions  NECK: no adenopathy, no asymmetry, masses, or scars and thyroid normal to palpation  RESP: lungs clear to auscultation - no rales, rhonchi or wheezes  CV: regular rate and rhythm, normal S1 S2, no S3 or S4, no murmur, click or rub, no peripheral edema and peripheral pulses strong  ABDOMEN: soft, nontender, no hepatosplenomegaly, no masses and bowel sounds normal  MS: no gross musculoskeletal defects noted, no edema  SKIN: no suspicious lesions or rashes  NEURO: Normal strength and tone, sensory exam grossly normal, mentation intact, cranial nerves 2-12 intact, gait normal including heel/toe/tandem walking and Romberg normal  PSYCH: mentation appears normal, affect normal/bright    Diagnostic Test Results:  Labs reviewed in Epic  Results for orders placed or performed in visit on 01/23/19   GLUCOSE   Result Value Ref Range    Glucose 89 70 - 99 mg/dL   Lipid panel reflex to direct LDL Fasting   Result Value Ref Range    Cholesterol 132 <200 mg/dL    Triglycerides 238 (H) <150 mg/dL    HDL Cholesterol 27 (L) >49 mg/dL    LDL Cholesterol Calculated 57 <100 mg/dL    Non HDL Cholesterol 105 <130 mg/dL   Hemoglobin A1c   Result Value Ref Range    Hemoglobin A1C 5.6 0 - 5.6 %   TSH with free T4 reflex   Result Value Ref Range    TSH 1.80 0.40 - 4.00 mU/L   Vitamin B12   Result Value Ref Range    Vitamin B12 532 193 - 986 pg/mL   Vitamin D Deficiency   Result Value Ref Range    Vitamin D Deficiency screening 9 (L) 20 - 75 ug/L           Assessment & Plan     1. Coitus painful for female  Declined exam today - US ordered then referred to OB/GYN for further work up.    - US Pelvic Complete with Transvaginal; Future  - OB/GYN REFERRAL    2. Dizziness  Suspect vertigo as it is only when she is lying down and only on one side and when positioning to that side.  Discussed treatment, red flags, hydration and reasons to follow up in clinic.  Call if medication not  "successful would place referral fo  - meclizine (ANTIVERT) 25 MG tablet; Take 1 tablet (25 mg) by mouth 3 times daily as needed for dizziness (vertigo)  Dispense: 21 tablet; Refill: 1     BMI:   Estimated body mass index is 47.25 kg/m  as calculated from the following:    Height as of this encounter: 1.537 m (5' 0.5\").    Weight as of this encounter: 111.6 kg (246 lb).   Weight management plan: Discussed healthy diet and exercise guidelines      Total times spent with patient 30 minutes of which > 50% of the time was spent counseling and coordination of care discussion of above complaints, treatment options, testing, reviewed previous labs, and work up, recommendations and follow up     Patient Instructions       Patient Education     Managing Dizziness (Vertigo) with Medicines    Although medicines can't cure your problem, they can help control symptoms. Your doctor may prescribe medicines for a few weeks and then taper them off. Always take your medicine as prescribed. Never share your medicine with others.  Contact your healthcare provider right away if you have side effects from your medicines.   How medicines can help    Treat infection or inflammation. If you have an infection caused by bacteria, your doctor can prescribe antibiotics.    Limit conflicting balance signals. These medicines are often in pill form.    Ease nausea. Suppositories, pills, or shots can reduce vomiting.    Reduce pressure in the canals. Diuretics can be used to treat Meniere's disease. These medicines help your body get rid of extra fluid.    Ease other symptoms. Other medicines can help ease depression and anxiety caused by living with dizziness or fainting.  Date Last Reviewed: 11/1/2016 2000-2018 [employer]. [address] [place] [address], [place] [address]. All rights reserved. This information is not intended as a substitute for professional medical care. Always follow your healthcare professional's instructions.       "         No follow-ups on file.    Maddie Mazariegos NP  [facility]

## 2019-06-12 ENCOUNTER — TELEPHONE (OUTPATIENT)
Dept: SLEEP MEDICINE | Facility: CLINIC | Age: 44
End: 2019-06-12

## 2019-06-12 NOTE — TELEPHONE ENCOUNTER
Reason for call:  Other   Patient called regarding (reason for call): appointment  Additional comments: Patient forgot that she needs to be at work in [place] by 7:45am on Friday, June 21st, for her appointment. Is it possible for this appointment to be switched to a telephone visit? If not, Thursday mornings would work better for an appointment. She doesn't have to be to work until 8:45am on Thursdays, if Dr. Ramos can work her in for a Thursday morning appointment.     Phone number to reach patient:  Cell number on file:    Telephone Information:   Mobile [phone]       Best Time:  any    Can we leave a detailed message on this number?  YES     Vicki CEJA  Central Scheduler

## 2019-06-13 NOTE — TELEPHONE ENCOUNTER
Called pt and left a message. We are unable to get her into clinic for a different appointment until August and the next phone visit available is July 12 th. She can definitely change her appointment to a phone visit, but it will get pushed back a couple weeks. I gave her the number to central scheduling if she wants to reschedule.    Gina Iverson, CMA

## 2019-06-14 LAB — SLPCOMP: NORMAL

## 2019-06-21 ENCOUNTER — OFFICE VISIT (OUTPATIENT)
Dept: SLEEP MEDICINE | Facility: CLINIC | Age: 44
End: 2019-06-21
Payer: COMMERCIAL

## 2019-06-21 VITALS
HEIGHT: 61 IN | HEART RATE: 66 BPM | BODY MASS INDEX: 46.44 KG/M2 | SYSTOLIC BLOOD PRESSURE: 148 MMHG | OXYGEN SATURATION: 96 % | DIASTOLIC BLOOD PRESSURE: 86 MMHG | WEIGHT: 246 LBS

## 2019-06-21 DIAGNOSIS — G47.33 OSA (OBSTRUCTIVE SLEEP APNEA): Primary | ICD-10-CM

## 2019-06-21 PROCEDURE — 99214 OFFICE O/P EST MOD 30 MIN: CPT | Performed by: FAMILY MEDICINE

## 2019-06-21 ASSESSMENT — MIFFLIN-ST. JEOR: SCORE: 1685.29

## 2019-06-21 NOTE — PROGRESS NOTES
"  Chief Complaint   Patient presents with     Study Results     Pt is here to follow up on results from her PSG.       Nila Starks is a 44 year old female who returns to [facility] for review of sleep testing results. She presented with symptoms suggestive of obstructive sleep apnea.    Goal of obtaining oral appliance for treatment of HIEN.  Only able to obtain partial results from her prior PSG performed at [facility] on 4/8/2005 with undocumented weight, RDI 59, jose enrique SpO2 88%.  CPAP at 8 effective lateral and 13 cm H2O supine.  CPAP was reportedly poorly tolerated.    Estimated body mass index is 47.25 kg/m  as calculated from the following:    Height as of this encounter: 1.537 m (5' 0.5\").    Weight as of this encounter: 111.6 kg (246 lb).  Total score - Pinson: 5 (4/29/2019 10:00 AM)  No data recorded    Polysomnography - Test date 6/11/2019    Sleep latency 38.9 minutes without Sleep Aid.  REM achieved with latency of 233 minutes.  Sleep efficiency 68%. Total sleep time 296 minutes.    Sleep architecture:  Stage 1, 12% (5%), stage 2, 60% (45-55%), stage 3, 13% (15-20%), stage REM, 15% (20-25%).  AHI was 6.9, with desaturations down to 80%. RDI 22.9.  REM AHI 39.1, consistent with severe REM HIEN.       CPAP titration:  Not performed      Nila Starks reports that she slept Poor .     Results were reviewed in detail today with Nila and a copy given to her for her records.    Reviewed by team: Tobacco  Allergies  Meds  Med Hx  Surg Hx  Fam Hx  Soc Hx      Reviewed by provider:          Problem List:  Patient Active Problem List    Diagnosis Date Noted     Morbid obesity (H) 01/23/2019     Priority: Medium     HIEN (obstructive sleep apnea) 01/23/2019     Priority: Medium     Gastroesophageal reflux disease without esophagitis 01/23/2019     Priority: Medium     Numbness and tingling 01/23/2019     Priority: Medium     Hypertrophy of tonsils alone 12/15/2007     Priority: " "Medium        /86   Pulse 66   Ht 1.537 m (5' 0.5\")   Wt 111.6 kg (246 lb)   SpO2 96%   BMI 47.25 kg/m      Impression/Plan:    1.)  Mild to moderate HIEN with mild sleep-associated hypoxemia   - Potential that severity under-reported given no supine REM observed   - Overall, she appears to be a good candidate for an oral appliance.   - Sleep dentistry referral placed with Dr. Crawford.   - If clinically doing well, can consider if follow-up home sleep test is needed.      Twenty-five minutes spent with patient, all of which were spent face-to-face counseling, consulting, coordinating plan of care.      Nirav Ramos MD, MD    CC:  Vianey Sow (only for reference, does not automatically route).  "

## 2019-06-21 NOTE — PATIENT INSTRUCTIONS

## 2019-10-17 ENCOUNTER — HOSPITAL ENCOUNTER (OUTPATIENT)
Dept: ULTRASOUND IMAGING | Facility: CLINIC | Age: 44
Discharge: HOME | End: 2019-10-17
Attending: NURSE PRACTITIONER | Admitting: NURSE PRACTITIONER
Payer: COMMERCIAL

## 2019-10-17 DIAGNOSIS — N94.10 COITUS PAINFUL FOR FEMALE: ICD-10-CM

## 2019-10-17 PROCEDURE — 76830 TRANSVAGINAL US NON-OB: CPT

## 2019-10-18 DIAGNOSIS — N94.10 DYSPAREUNIA, FEMALE: Primary | ICD-10-CM

## 2020-02-27 ENCOUNTER — TELEPHONE (OUTPATIENT)
Dept: FAMILY MEDICINE | Facility: CLINIC | Age: 45
End: 2020-02-27

## 2020-02-27 NOTE — LETTER
February 27, 2020      Nila Starks  [address] [place] [address]      Dear Nila Starks, [phone]    At [facility] we care about your health and are committed to providing quality patient care, which includes staying current on preventative cancer screenings.  You can increase your chances of finding and treating cancers through regular screenings.      Our records show that you are due for the following screening(s):    Pap Smear for cervical cancer - [phone] to schedule  Recommended every three years for women 21 and older  A Pap test is used to detect cervical cancer.  The test should be taken at least once every three years but women who are at a greater risk for cervical cancer may need to have the test more often.      You are at a greater risk for cervical cancer if:   - You have had a sexually transmitted disease   - You have had more than one sex partner   - You have had an abnormal pap test in the past    If you have a My-Chart Account, you also can schedule this appointment through there.    If you have already had one or all of the above screening tests at another facility, please call us so that we may update your chart.      Your partners in health,      Quality Committee   [facility]      Sincerely,      Maddie Mazariegos NP

## 2020-02-27 NOTE — TELEPHONE ENCOUNTER
Panel Management Review      Patient has the following on her problem list:   Patient Active Problem List   Diagnosis     Hypertrophy of tonsils alone     Morbid obesity (H)     HIEN (obstructive sleep apnea)     Gastroesophageal reflux disease without esophagitis     Numbness and tingling         Composite cancer screening  Chart review shows that this patient is due/due soon for the following Pap Smear  Summary:    Patient is due/failing the following:   PAP and PHYSICAL    Action needed:   Patient needs office visit for physical/pap.    Type of outreach:    Sent letter.    Questions for provider review:    None                                                                                                                                    Delaney Brunson on 2/27/2020 at 1:09 PM       Chart routed to none .

## 2020-03-18 ENCOUNTER — NURSE TRIAGE (OUTPATIENT)
Dept: FAMILY MEDICINE | Facility: CLINIC | Age: 45
End: 2020-03-18

## 2020-03-18 NOTE — TELEPHONE ENCOUNTER
----- Message from Delaney Brunson sent at 3/18/2020  7:30 AM CDT -----  Regarding: Triage  Per provider request, please triage patient regarding appointment on 03/26/2020 at 0740 AM for dizziness and stomach ache. Prefer telephone visit if applicable. .    Delaney Brunson on 3/18/2020 at 7:32 AM

## 2020-03-19 NOTE — TELEPHONE ENCOUNTER
S-(situation): Triage 03/26/20 appt per CMA    B-(background): LOV 06/11/19 with Yair SULTANA-(assessment): has multiple concerns she wanted to discuss:    1. Home BP was elevated- 145-93, has not checked since.     2. Felt light-headed: happened 3-4 weeks ago and thinks due to not eating.     3. Stomach ache- ongoing for 1 year. Upset stomach-bouts of diarrhea but not currently. Blood in stool only 2 episodes-once one year ago but had menses so wasn't sure if stool. One other episode 1 week ago. On toilet paper.   Constipation as well. Hx of hemorrhoids with pregnancy but unsure if she has internal. Does feel uncomfortable around rectum.     R-(recommendations): Routing to provider to update regarding 3/26/20 visit     MAX BlantonN, RN

## 2020-03-23 NOTE — TELEPHONE ENCOUNTER
CSS: Please notify patient to keep in person clinic appointment tomorrow with provider.     Left message for patient to return call to clinic.     Angela Williamson, MAXN, RN

## 2020-03-24 NOTE — TELEPHONE ENCOUNTER
2nd attempt:    CSS: Please notify patient to keep in person clinic appointment tomorrow with provider.      Left message for patient to return call to clinic.      Angela Williamson, BSN, RN

## 2020-03-25 NOTE — TELEPHONE ENCOUNTER
Message left to return call.    I did also state that Provider recommends she keep scheduled in person appointment since we have not been able to reach patient.     Mere Witt RN

## 2020-06-19 ENCOUNTER — APPOINTMENT (OUTPATIENT)
Dept: CT IMAGING | Facility: CLINIC | Age: 45
End: 2020-06-19
Attending: EMERGENCY MEDICINE
Payer: COMMERCIAL

## 2020-06-19 ENCOUNTER — TELEPHONE (OUTPATIENT)
Dept: URGENT CARE | Facility: URGENT CARE | Age: 45
End: 2020-06-19

## 2020-06-19 ENCOUNTER — APPOINTMENT (OUTPATIENT)
Dept: MRI IMAGING | Facility: CLINIC | Age: 45
End: 2020-06-19
Attending: EMERGENCY MEDICINE
Payer: COMMERCIAL

## 2020-06-19 ENCOUNTER — HOSPITAL ENCOUNTER (EMERGENCY)
Facility: CLINIC | Age: 45
Discharge: HOME | End: 2020-06-19
Attending: EMERGENCY MEDICINE | Admitting: EMERGENCY MEDICINE
Payer: COMMERCIAL

## 2020-06-19 VITALS
BODY MASS INDEX: 46.92 KG/M2 | OXYGEN SATURATION: 95 % | WEIGHT: 239 LBS | HEART RATE: 83 BPM | HEIGHT: 60 IN | RESPIRATION RATE: 29 BRPM | DIASTOLIC BLOOD PRESSURE: 86 MMHG | SYSTOLIC BLOOD PRESSURE: 126 MMHG

## 2020-06-19 DIAGNOSIS — G51.0 BELL'S PALSY: ICD-10-CM

## 2020-06-19 DIAGNOSIS — E04.1 THYROID NODULE: ICD-10-CM

## 2020-06-19 LAB
ANION GAP SERPL CALCULATED.3IONS-SCNC: 7 MMOL/L (ref 3–14)
ANISOCYTOSIS BLD QL SMEAR: SLIGHT
APTT PPP: 30 SEC (ref 22–37)
BASOPHILS # BLD AUTO: 0 10E9/L (ref 0–0.2)
BASOPHILS NFR BLD AUTO: 0 %
BLASTS # BLD: 0.1 10E9/L
BLASTS BLD QL SMEAR: 1 %
BUN SERPL-MCNC: 12 MG/DL (ref 7–30)
CALCIUM SERPL-MCNC: 8.3 MG/DL (ref 8.5–10.1)
CHLORIDE SERPL-SCNC: 105 MMOL/L (ref 94–109)
CO2 SERPL-SCNC: 27 MMOL/L (ref 20–32)
CREAT SERPL-MCNC: 0.72 MG/DL (ref 0.52–1.04)
DIFFERENTIAL METHOD BLD: ABNORMAL
EOSINOPHIL # BLD AUTO: 0.5 10E9/L (ref 0–0.7)
EOSINOPHIL NFR BLD AUTO: 4 %
ERYTHROCYTE [DISTWIDTH] IN BLOOD BY AUTOMATED COUNT: 17.8 % (ref 10–15)
GFR SERPL CREATININE-BSD FRML MDRD: >90 ML/MIN/{1.73_M2}
GLUCOSE BLDC GLUCOMTR-MCNC: 176 MG/DL (ref 70–99)
GLUCOSE SERPL-MCNC: 158 MG/DL (ref 70–99)
HCG SERPL QL: NEGATIVE
HCT VFR BLD AUTO: 39.8 % (ref 35–47)
HGB BLD-MCNC: 12.3 G/DL (ref 11.7–15.7)
INR PPP: 1.11 (ref 0.86–1.14)
LYMPHOCYTES # BLD AUTO: 5.1 10E9/L (ref 0.8–5.3)
LYMPHOCYTES NFR BLD AUTO: 44 %
MCH RBC QN AUTO: 23.3 PG (ref 26.5–33)
MCHC RBC AUTO-ENTMCNC: 30.9 G/DL (ref 31.5–36.5)
MCV RBC AUTO: 75 FL (ref 78–100)
MONOCYTES # BLD AUTO: 0.6 10E9/L (ref 0–1.3)
MONOCYTES NFR BLD AUTO: 5 %
NEUTROPHILS # BLD AUTO: 5.3 10E9/L (ref 1.6–8.3)
NEUTROPHILS NFR BLD AUTO: 46 %
PLATELET # BLD AUTO: 425 10E9/L (ref 150–450)
PLATELET # BLD EST: ABNORMAL 10*3/UL
POTASSIUM SERPL-SCNC: 3.3 MMOL/L (ref 3.4–5.3)
RBC # BLD AUTO: 5.28 10E12/L (ref 3.8–5.2)
SODIUM SERPL-SCNC: 139 MMOL/L (ref 133–144)
TROPONIN I SERPL-MCNC: <0.015 UG/L (ref 0–0.04)
WBC # BLD AUTO: 11.6 10E9/L (ref 4–11)

## 2020-06-19 PROCEDURE — 85025 COMPLETE CBC W/AUTO DIFF WBC: CPT | Performed by: EMERGENCY MEDICINE

## 2020-06-19 PROCEDURE — 70450 CT HEAD/BRAIN W/O DYE: CPT | Mod: XS

## 2020-06-19 PROCEDURE — 96361 HYDRATE IV INFUSION ADD-ON: CPT | Performed by: EMERGENCY MEDICINE

## 2020-06-19 PROCEDURE — 70553 MRI BRAIN STEM W/O & W/DYE: CPT

## 2020-06-19 PROCEDURE — 84703 CHORIONIC GONADOTROPIN ASSAY: CPT | Performed by: EMERGENCY MEDICINE

## 2020-06-19 PROCEDURE — 99291 CRITICAL CARE FIRST HOUR: CPT | Mod: 25 | Performed by: EMERGENCY MEDICINE

## 2020-06-19 PROCEDURE — 25000128 H RX IP 250 OP 636: Performed by: EMERGENCY MEDICINE

## 2020-06-19 PROCEDURE — 25000125 ZZHC RX 250: Performed by: EMERGENCY MEDICINE

## 2020-06-19 PROCEDURE — 25800030 ZZH RX IP 258 OP 636: Performed by: EMERGENCY MEDICINE

## 2020-06-19 PROCEDURE — 85610 PROTHROMBIN TIME: CPT | Performed by: EMERGENCY MEDICINE

## 2020-06-19 PROCEDURE — 93005 ELECTROCARDIOGRAM TRACING: CPT | Performed by: EMERGENCY MEDICINE

## 2020-06-19 PROCEDURE — A9585 GADOBUTROL INJECTION: HCPCS | Performed by: EMERGENCY MEDICINE

## 2020-06-19 PROCEDURE — 80048 BASIC METABOLIC PNL TOTAL CA: CPT | Performed by: EMERGENCY MEDICINE

## 2020-06-19 PROCEDURE — 00000146 ZZHCL STATISTIC GLUCOSE BY METER IP

## 2020-06-19 PROCEDURE — 25500064 ZZH RX 255 OP 636: Performed by: EMERGENCY MEDICINE

## 2020-06-19 PROCEDURE — 84484 ASSAY OF TROPONIN QUANT: CPT | Performed by: EMERGENCY MEDICINE

## 2020-06-19 PROCEDURE — 96374 THER/PROPH/DIAG INJ IV PUSH: CPT | Mod: 59 | Performed by: EMERGENCY MEDICINE

## 2020-06-19 PROCEDURE — 85730 THROMBOPLASTIN TIME PARTIAL: CPT | Performed by: EMERGENCY MEDICINE

## 2020-06-19 PROCEDURE — 99285 EMERGENCY DEPT VISIT HI MDM: CPT | Mod: Z6 | Performed by: EMERGENCY MEDICINE

## 2020-06-19 PROCEDURE — 70496 CT ANGIOGRAPHY HEAD: CPT

## 2020-06-19 RX ORDER — IOPAMIDOL 755 MG/ML
70 INJECTION, SOLUTION INTRAVASCULAR ONCE
Status: DISCONTINUED | OUTPATIENT
Start: 2020-06-19 | End: 2020-06-19

## 2020-06-19 RX ORDER — GADOBUTROL 604.72 MG/ML
10 INJECTION INTRAVENOUS ONCE
Status: COMPLETED | OUTPATIENT
Start: 2020-06-19 | End: 2020-06-19

## 2020-06-19 RX ORDER — VALACYCLOVIR HYDROCHLORIDE 1 G/1
1000 TABLET, FILM COATED ORAL 3 TIMES DAILY
Qty: 21 TABLET | Refills: 0 | Status: SHIPPED | OUTPATIENT
Start: 2020-06-19 | End: 2020-08-25

## 2020-06-19 RX ORDER — LORAZEPAM 2 MG/ML
1 INJECTION INTRAMUSCULAR ONCE
Status: COMPLETED | OUTPATIENT
Start: 2020-06-19 | End: 2020-06-19

## 2020-06-19 RX ORDER — IOPAMIDOL 755 MG/ML
70 INJECTION, SOLUTION INTRAVASCULAR ONCE
Status: COMPLETED | OUTPATIENT
Start: 2020-06-19 | End: 2020-06-19

## 2020-06-19 RX ADMIN — SODIUM CHLORIDE 500 ML: 9 INJECTION, SOLUTION INTRAVENOUS at 10:39

## 2020-06-19 RX ADMIN — GADOBUTROL 10 ML: 604.72 INJECTION INTRAVENOUS at 12:12

## 2020-06-19 RX ADMIN — LORAZEPAM 1 MG: 2 INJECTION INTRAMUSCULAR; INTRAVENOUS at 12:04

## 2020-06-19 RX ADMIN — SODIUM CHLORIDE 100 ML: 9 INJECTION, SOLUTION INTRAVENOUS at 10:19

## 2020-06-19 RX ADMIN — IOPAMIDOL 70 ML: 755 INJECTION, SOLUTION INTRAVENOUS at 10:18

## 2020-06-19 ASSESSMENT — MIFFLIN-ST. JEOR: SCORE: 1640.6

## 2020-06-19 NOTE — DISCHARGE INSTRUCTIONS
Prednisone and valtrex as prescribed    Follow-up in clinic 2 to 3 weeks    Lubricating eyedrops as discussed,    Tape her eye shut at night if it will not close

## 2020-06-19 NOTE — ED PROVIDER NOTES
Stroke evaluation  seen in room 3 upon arrival  History     Chief Complaint   Patient presents with     One-sided Weakness     left side weakness, facial droop. Stroke eval called Dr De Santiago notified     HPI  Nila Starks is a 45 year old female who resents via EMS from work, sudden onset left-sided weakness left face, noticed in the mirror when she went to the bathroom.  She describes a numb feeling of the face as well as the left side of her tongue, numbness in her left arm hand and left lower leg.  She denies actual left arm or left leg weakness or clumsiness, she was able to walk from the bathroom back to her desk.  She called nurse advice line who recommended she call 911.  Medics note left facial droop and drooling.  They noted no extremity weakness.  Blood sugar 160s in route.  Patient currently feels numbness left face, left tongue, mild tingling left lower leg.  Denies associated headache, difficulty speaking or swallowing.  She denies such symptoms in the past.  She does not take any antiplatelet or anticoagulation medication.    Allergies:  Allergies   Allergen Reactions     Penicillins Rash       Problem List:    Patient Active Problem List    Diagnosis Date Noted     Morbid obesity (H) 01/23/2019     Priority: Medium     HIEN (obstructive sleep apnea) 01/23/2019     Priority: Medium     Mild to moderate HIEN with mild sleep-associated hypoxemia   - Potential that severity under-reported given no supine REM observed    Polysomnography - Test date 6/11/2019    Weight 246 lbs    Sleep latency 38.9 minutes without Sleep Aid.  REM achieved with latency of 233 minutes.  Sleep efficiency 68%. Total sleep time 296 minutes.    Sleep architecture:  Stage 1, 12% (5%), stage 2, 60% (45-55%), stage 3, 13% (15-20%), stage REM, 15% (20-25%).  AHI was 6.9, with desaturations down to 80%. RDI 22.9.  REM AHI 39.1, consistent with severe REM HIEN.       CPAP titration:  Not performed         Gastroesophageal reflux  disease without esophagitis 2019     Priority: Medium     Numbness and tingling 2019     Priority: Medium     Hypertrophy of tonsils alone 12/15/2007     Priority: Medium        Past Medical History:    Past Medical History:   Diagnosis Date     Abnormal maternal glucose tolerance, complicating pregnancy, childbirth, or the puerperium, unspecified as to episode of care      Unspecified sleep apnea        Past Surgical History:    Past Surgical History:   Procedure Laterality Date     C  DELIVERY ONLY  3/98, ,      C ORAL SURGERY PROCEDURE       HC DILATION/CURETTAGE DIAG/THER NON OB  2002    Missed Ab       Family History:    Family History   Problem Relation Age of Onset     Diabetes Mother         type 2     Diabetes Father         type 2     Hypertension Father      Prostate Cancer Father      Prostate Problems Father      Skin Cancer Sister      Diabetes Sister        Social History:  Marital Status:   [2]  Social History     Tobacco Use     Smoking status: Never Smoker     Smokeless tobacco: Never Used   Substance Use Topics     Alcohol use: No     Drug use: No        Medications:    valACYclovir (VALTREX) 1000 mg tablet  meclizine (ANTIVERT) 25 MG tablet  NO ACTIVE MEDICATIONS  VITAMIN D, CHOLECALCIFEROL, PO  VITAMIN E BLEND PO          Review of Systems  All other systems reviewed and are negative.    Physical Exam   BP: (!) 165/88  Pulse: 101  Heart Rate: 70  Resp: (!) 31  Height: 152.4 cm (5')  Weight: 108.4 kg (239 lb)  SpO2: 96 %      Physical Exam  Nontoxic-appearing no respiratory distress alert and oriented x3. GCS 15 on arrival, throughout stay and at discharge.    Head atraumatic normocephalic    Cranial nerves; vision baseline fields intact, PERRL, EOMI, facial sensation intact to light touch, facial muscle tone asymmetrical, mid and lower face droop, forehead is spared, hearing grossly intact,swallowing without difficulty, voice baseline and normal, SCM   strength intact, tongue protrudes slightly to the right.  Palatal elevation symmetric    Oropharynx moist without lesions or erythema.  TMs/EACs are clear and unremarkable, there is some mild numbness posterior to left ear.    Neck supple full active painless range of motion no posterior midline tenderness.    No cervical adenopathy    Lungs clear to auscultation no rales rhonchi or wheezes    Heart regular no murmur S3 or rub    Abdomen soft nontender     Strength and sensation intact throughout the extremities, skin clear from rash or lesion.    Extremities are atraumatic, full painless active range of motion all joints    There is no ataxia.      ED Course        Procedures               Critical Care time:  none               Results for orders placed or performed during the hospital encounter of 06/19/20 (from the past 24 hour(s))   Glucose by meter   Result Value Ref Range    Glucose 176 (H) 70 - 99 mg/dL   CBC with platelets differential   Result Value Ref Range    WBC 11.6 (H) 4.0 - 11.0 10e9/L    RBC Count 5.28 (H) 3.8 - 5.2 10e12/L    Hemoglobin 12.3 11.7 - 15.7 g/dL    Hematocrit 39.8 35.0 - 47.0 %    MCV 75 (L) 78 - 100 fl    MCH 23.3 (L) 26.5 - 33.0 pg    MCHC 30.9 (L) 31.5 - 36.5 g/dL    RDW 17.8 (H) 10.0 - 15.0 %    Platelet Count 425 150 - 450 10e9/L    Diff Method Manual Differential     % Neutrophils 46.0 %    % Lymphocytes 44.0 %    % Monocytes 5.0 %    % Eosinophils 4.0 %    % Basophils 0.0 %    % Blasts 1.0 %    Absolute Neutrophil 5.3 1.6 - 8.3 10e9/L    Absolute Lymphocytes 5.1 0.8 - 5.3 10e9/L    Absolute Monocytes 0.6 0.0 - 1.3 10e9/L    Absolute Eosinophils 0.5 0.0 - 0.7 10e9/L    Absolute Basophils 0.0 0.0 - 0.2 10e9/L    Absolute Blasts 0.1 (H) 0 10e9/L    Anisocytosis Slight     Platelet Estimate       Automated count confirmed.  Platelet morphology is normal.   Basic metabolic panel   Result Value Ref Range    Sodium 139 133 - 144 mmol/L    Potassium 3.3 (L) 3.4 - 5.3 mmol/L    Chloride  105 94 - 109 mmol/L    Carbon Dioxide 27 20 - 32 mmol/L    Anion Gap 7 3 - 14 mmol/L    Glucose 158 (H) 70 - 99 mg/dL    Urea Nitrogen 12 7 - 30 mg/dL    Creatinine 0.72 0.52 - 1.04 mg/dL    GFR Estimate >90 >60 mL/min/[1.73_m2]    GFR Estimate If Black >90 >60 mL/min/[1.73_m2]    Calcium 8.3 (L) 8.5 - 10.1 mg/dL   INR   Result Value Ref Range    INR 1.11 0.86 - 1.14   Partial thromboplastin time   Result Value Ref Range    PTT 30 22 - 37 sec   Troponin I   Result Value Ref Range    Troponin I ES <0.015 0.000 - 0.045 ug/L   HCG qualitative pregnancy (blood)   Result Value Ref Range    HCG Qualitative Serum Negative NEG^Negative   CT Head w/o Contrast    Narrative    CT SCAN OF THE HEAD WITHOUT CONTRAST   6/19/2020 10:23 AM     HISTORY: Left side weakness.    TECHNIQUE:  Axial images of the head and coronal reformations without  IV contrast material. Radiation dose for this scan was reduced using  automated exposure control, adjustment of the mA and/or kV according  to patient size, or iterative reconstruction technique.    COMPARISON: None.    FINDINGS: There is no evidence of intracranial hemorrhage, mass, acute  infarct or anomaly. The ventricles are normal in size, shape and  configuration. The brain parenchyma and subarachnoid spaces are  normal.     The visualized portions of the sinuses and mastoids appear normal. The  bony calvarium and bones of the skull base appear intact.       Impression    IMPRESSION: No extended signs of acute infarct. No intracranial  hemorrhage. ASPECT score = 10.    ERNESTINE DE LEON MD   CTA Head Neck with Contrast    Narrative    CT ANGIOGRAM OF THE HEAD AND NECK WITH CONTRAST  6/19/2020 10:28 AM     HISTORY: Left-sided weakness.    TECHNIQUE:  CT angiography with an injection of 70 mL Isovue IV with  scans through the head and neck. Images were transferred to a separate  3-D workstation where multiplanar reformations and 3-D images were  created. Estimates of carotid stenoses are  made relative to the distal  internal carotid artery diameters except as noted. Radiation dose for  this scan was reduced using automated exposure control, adjustment of  the mA and/or kV according to patient size, or iterative  reconstruction technique.    COMPARISON: CT head of same date.    CT HEAD FINDINGS: No contrast enhancing lesions. Cerebral blood flow  is grossly normal.     CT ANGIOGRAM HEAD FINDINGS:  The major intracranial arteries including  the proximal branches of the anterior cerebral, middle cerebral, and  posterior cerebral arteries appear patent without vascular cutoff. No  aneurysm identified. No significant stenosis. Venous circulation is  unremarkable.     CT ANGIOGRAM NECK FINDINGS:   Normal origin of the great vessels from the aortic arch.     Right carotid artery: The right common and internal carotid arteries  are patent. No significant stenosis or atherosclerotic disease in the  carotid artery.     Left carotid artery: The left common and internal carotid arteries are  patent. No significant stenosis or atherosclerotic disease in the  carotid artery.     Vertebral arteries: Vertebral arteries are patent without evidence of  dissection. No significant stenosis.     Other findings: Ovoid heterogeneously attenuating left thyroid nodule  measuring 3.8 x 2.5 x 4.1 cm (series 7 image 42, series 8 image 51).  Prominent degenerative disc disease at C6-C7. Reversal of the normal  cervical lordosis.      Impression    IMPRESSION:  1. No significant stenosis or large-vessel occlusion involving the  major craniocervical arterial vasculature.  2. Heterogeneous left thyroid nodule measuring up to 4.1 cm. Recommend  dedicated thyroid ultrasound for further evaluation.    The findings and recommendations were discussed by myself with Dr. Starks at approximately 10:35 AM on 6/19/2020.    ERNESTINE DE LEON MD   MR Brain w/o & w Contrast    Narrative    MRI BRAIN WITHOUT AND WITH CONTRAST  6/19/2020 12:47  PM    HISTORY:  Focal neuro deficit, less than 6 hours, stroke suspected.  Left mid and lower facial droop, left tongue numbness, please evaluate  for possible Bell's palsy, thin cuts through cranial nerves.     TECHNIQUE:  Multiplanar, multisequence MRI of the brain without and  with 10 mL Gadavist.    COMPARISON: Head CT 6/19/2020    FINDINGS:  The cerebral hemispheres, brainstem, and cerebellum  demonstrate normal morphology and signal. No evidence of acute  ischemia, hemorrhage, mass, mass effect, or hydrocephalus. Slight  asymmetric enhancement is present involving the geniculate segment of  the left facial nerve (series 15 image 6, series 14 image 7, series 16  image 7, cranial nerves VII and VIII and the internal auditory canals  are otherwise unremarkable.    The visualized calvarium, tympanic cavities, and mastoid cavities are  unremarkable. Left maxillary sinus retention cyst is present.      Impression    IMPRESSION:  1. No evidence of acute ischemia or hemorrhage.  2. Slight nonspecific asymmetric enhancement of the geniculate segment  of the left facial nerve. Infectious/inflammatory neuritis (Bell's  palsy versus Hiawatha Casas) cannot be excluded.    BRAYAN JANE MD       Medications   0.9% sodium chloride BOLUS (0 mLs Intravenous Stopped 6/19/20 1140)   iopamidol (ISOVUE-370) solution 70 mL (70 mLs Intravenous Given 6/19/20 1018)   sodium chloride 0.9 % bag 500mL for CT scan flush use (100 mLs Intravenous Given 6/19/20 1019)   LORazepam (ATIVAN) injection 1 mg (1 mg Intravenous Given 6/19/20 1204)   gadobutrol (GADAVIST) injection 10 mL (10 mLs Intravenous Given 6/19/20 1212)       Assessments & Plan (with Medical Decision Making)  Patient presents with facial droop, reported left-sided weakness although on further questioning she really had no weakness just some numbness and tingling in the left arm hand and foot.  Code stroke called, reviewed with Dr. Boone who evaluated patient remotely after CT/CT  angiogram head and neck negative for acute finding.  Recommended MRI with thin cuts of facial nerve, findings as above consistent with mild inflammatory changes geniculate segment of the left facial nerve.  Overall picture consistent with Bell's palsy.  Treat with steroids and Valtrex.  Follow-up and return criteria reviewed.  Precautions regarding corneal injury if her eyes not closing completely.     I have reviewed the nursing notes.    I have reviewed the findings, diagnosis, plan and need for follow up with the patient.          Discharge Medication List as of 6/19/2020  1:51 PM      START taking these medications    Details   valACYclovir (VALTREX) 1000 mg tablet Take 1 tablet (1,000 mg) by mouth 3 times daily for 7 days, Disp-21 tablet,R-0, E-Prescribe             Final diagnoses:   Thyroid nodule   Bell's palsy       6/19/2020   [facility] EMERGENCY DEPARTMENT     Enzo Starks MD  06/19/20 0036

## 2020-06-19 NOTE — CONSULTS
[facility]    Stroke Consult Note    Reason for Consult: stroke code    Chief Complaint: One-sided Weakness (left side weakness, facial droop. Stroke eval called Dr De Santiago notified)      DEONNA Starks is a 45 year old right-handed woman with a past medical history of possible hypertension in the past per the patient, sleep apnea per the chart, oral HSV, but not much other medical history.  She presents to the hospital because this morning at 830 after she had already been awake for little while she noticed that the left side of her face and her left side of her tongue felt numb.  She was at work when this happened.  Shortly thereafter she also felt like she had some numbness in her left leg but this only lasted briefly and is no longer present.  She tells me that she noticed no symptoms in her left arm.  She has never had symptoms like this before.     Imaging in the ED with CT, CTA head/neck was unremarkable for any findings associated with acute stroke.    TPA Treatment   Not given due to stroke mimic: possible Bell's, minor / isolated / quickly resolving stroke symptoms.    Endovascular Treatment  Not initiated due to absence of proximal vessel occlusion    Impression  Left facial droop and numbness, more concerning for early Bell's than acute stroke. However, given the lack of involvement of her left forehead, recommend stat brain MRI to rule out the less likely possibility of acute stroke.    Recommendations  - Stat brain MRI with special attention to the cranial nerves to evaluate for any enhancement which can be associated with Bell's  - If no stroke seen on brain MRI, standard supportive care for Bell's is liberal use of lubricating eye drops, as well as eye ointment an taping the eye shut at night to prevent corneal abrasion  - Advised patient to call us back right away if any other symptoms develop    Thank you for this consult. I discussed the patient's case with my  "attending Dr. Carlos Manuel Reveles after I saw the patient as well as the ED physician and the patient's nurse.    Qian Boone PA-C  Neurology  2020 11:19 AM  To page me or covering stroke neurology team member, click here: AMCOM  Choose \"On Call\" tab at top, then search dropdown box for \"Neurology Adult\" & press Enter, look for Neuro ICU/Stroke    ______________________________________________________    Past Medical History   Past Medical History:   Diagnosis Date     Abnormal maternal glucose tolerance, complicating pregnancy, childbirth, or the puerperium, unspecified as to episode of care      Unspecified sleep apnea      Past Surgical History   Past Surgical History:   Procedure Laterality Date     C  DELIVERY ONLY  3/98, ,      C ORAL SURGERY PROCEDURE       HC DILATION/CURETTAGE DIAG/THER NON OB  2002    Missed Ab     Medications   Home Meds  Prior to Admission medications    Medication Sig Start Date End Date Taking? Authorizing Provider   meclizine (ANTIVERT) 25 MG tablet Take 1 tablet (25 mg) by mouth 3 times daily as needed for dizziness (vertigo) 19   Maddie Mazariegos, NP   NO ACTIVE MEDICATIONS . 07   Karol Antunez MD   VITAMIN D, CHOLECALCIFEROL, PO Take 5,000 Units by mouth daily    Reported, Patient   VITAMIN E BLEND PO Take 100 Units by mouth daily as needed     Reported, Patient       Scheduled Meds    LORazepam  1 mg Intravenous Once       Infusion Meds      PRN Meds      Allergies   Allergies   Allergen Reactions     Penicillins Rash     Family History   Family History   Problem Relation Age of Onset     Diabetes Mother         type 2     Diabetes Father         type 2     Hypertension Father      Prostate Cancer Father      Prostate Problems Father      Skin Cancer Sister      Diabetes Sister      Social History   Social History     Tobacco Use     Smoking status: Never Smoker     Smokeless tobacco: Never Used   Substance Use Topics     Alcohol use: No     " Drug use: No       Review of Systems   The 5 point Review of Systems is negative other than noted in the HPI or here.        PHYSICAL EXAMINATION  Pulse:  [] 70  Heart Rate:  [70] 70  Resp:  [31] 31  BP: (151-165)/(84-88) 151/84     General:  patient lying in bed without any acute distress    HEENT:  normocephalic/atraumatic  Pulmonary:  no respiratory distress    Neurologic  Mental Status:  alert, oriented x 3, follows commands, speech clear and fluent, naming and repetition normal  Cranial Nerves:  visual fields intact (tested by nurse), EOMI with normal smooth pursuit, slight asymmetry in the timing of blinking of one eyelid compared to the other, left lower facial droop but forehead appear to move normally hearing not formally tested but intact to conversation, no dysarthria, shoulder shrug equal bilaterally, tongue protrusion midline  Motor:  no abnormal movements, able to move all limbs antigravity spontaneously with no signs of hemiparesis observed, no pronator drift  Reflexes:  unable to test (telestroke)  Sensory:  light touch sensation intact and symmetric throughout upper and lower extremities (assessed by nurse), no extinction on double simultaneous stimulation (assessed by nurse)  Coordination:  normal finger-to-nose and heel-to-shin bilaterally without dysmetria, rapid alternating movements symmetric  Station/Gait:  unable to test due to telestroke      Dysphagia Screen  Per Nursing    Stroke Scales    NIHSS  Interval baseline (06/19/20 1052)   Interval Comments     1a. Level of Consciousness 0-->Alert, keenly responsive   1b. LOC Questions 0-->Answers both questions correctly   1c. LOC Commands 0-->Performs both tasks correctly   2.   Best Gaze 0-->Normal   3.   Visual 0-->No visual loss   4.   Facial Palsy 1-->Minor paralysis (flattened nasolabial fold, asymmetry on smiling)   5a. Motor Arm, Left 0-->No drift, limb holds 90 (or 45) degrees for full 10 secs   5b. Motor Arm, Right 0-->No drift,  limb holds 90 (or 45) degrees for full 10 secs   6a. Motor Leg, Left 0-->No drift, leg holds 30 degree position for full 5 secs   6b. Motor Leg, right 0-->No drift, leg holds 30 degree position for full 5 secs   7.   Limb Ataxia 0-->Absent   8.   Sensory 0-->Normal, no sensory loss   9.   Best Language 0-->No aphasia, normal   10. Dysarthria 0-->Normal   11. Extinction and Inattention  0-->No abnormality   Total 1 (06/19/20 1052)       Imaging  I personally reviewed all imaging; relevant findings per HPI.     Lab Results Data   CBC  Recent Labs   Lab 06/19/20  1017   WBC 11.6*   RBC 5.28*   HGB 12.3   HCT 39.8        Basic Metabolic Panel    Recent Labs   Lab 06/19/20  1017      POTASSIUM 3.3*   CHLORIDE 105   CO2 27   BUN 12   CR 0.72   *   MIKAELA 8.3*     Liver Panel  No lab results found.  INR  Recent Labs   Lab Test 06/19/20  1017   INR 1.11      Lipid Profile  Recent Labs   Lab Test 01/23/19  1140   CHOL 132   HDL 27*   LDL 57   TRIG 238*     A1C  Recent Labs   Lab Test 01/23/19  1140   A1C 5.6     Troponin I  Recent Labs   Lab 06/19/20  1017   TROPI <0.015          Stroke Code / Stroke Consult Data Data   Stroke Code Data  (for stroke code with tele)  Stroke code activated 06/19/20   1012   First stroke provider response 06/19/20   1013   Video start time 06/19/20   1027   Video end time 06/19/20   1049   Last known normal 06/19/20   0829   Time of discovery  (or onset of symptoms)  06/19/20   0830   Head CT read by me 06/19/20   1021   Was stroke code de-escalated? Yes 06/19/20 1052  symptoms not likely caused by stroke        Telestroke Service Details  Type of service telemedicine diagnostic assessment of acute neurological changes   Reason telemedicine is appropriate patient requires assessment with a specialist for diagnosis and treatment of neurological symptoms   Mode of transmission secure interactive audio and video communication per Deepti   Originating site (patient location)  [facility]    Distant site (provider location) Provider home office       I have personally spent a total of 50 minutes providing critical care services supervising this patient's stroke code activation.  I personally reviewed all lab values and radiology images.  I evaluated and directed care for the patient's critical condition.

## 2020-06-19 NOTE — TELEPHONE ENCOUNTER
Pt calling with L side of face drooping, blurred vision, facial numbness and L leg weakness.  Recommended calling 911.  Pt will do right now.  Pt is @ work in [place].  Diamond

## 2020-06-19 NOTE — ED AVS SNAPSHOT
[facility]  [address] [address]  Phone:  [phone]  Fax:  [phone]                                    Nila Starks   MRN: [id number]    Department:  [facility]   Date of Visit:  6/19/2020           After Visit Summary Signature Page    I have received my discharge instructions, and my questions have been answered. I have discussed any challenges I see with this plan with the nurse or doctor.    ..........................................................................................................................................  Patient/Patient Representative Signature      ..........................................................................................................................................  Patient Representative Print Name and Relationship to Patient    ..................................................               ................................................  Date                                   Time    ..........................................................................................................................................  Reviewed by Signature/Title    ...................................................              ..............................................  Date                                               Time          22EPIC Rev 08/18

## 2020-06-19 NOTE — ED TRIAGE NOTES
Pt arrived by [facility] from home with c/o L sided numbness with tongue and L facial weakness that started at 0830 this am. bg was 160 on ambulance and pt denies any blood thinners.

## 2020-06-23 ENCOUNTER — OFFICE VISIT (OUTPATIENT)
Dept: FAMILY MEDICINE | Facility: CLINIC | Age: 45
End: 2020-06-23
Payer: COMMERCIAL

## 2020-06-23 VITALS
RESPIRATION RATE: 12 BRPM | HEIGHT: 60 IN | SYSTOLIC BLOOD PRESSURE: 142 MMHG | HEART RATE: 76 BPM | OXYGEN SATURATION: 97 % | BODY MASS INDEX: 48.1 KG/M2 | WEIGHT: 245 LBS | TEMPERATURE: 98.4 F | DIASTOLIC BLOOD PRESSURE: 78 MMHG

## 2020-06-23 DIAGNOSIS — E66.01 CLASS 3 SEVERE OBESITY DUE TO EXCESS CALORIES WITH BODY MASS INDEX (BMI) OF 45.0 TO 49.9 IN ADULT, UNSPECIFIED WHETHER SERIOUS COMORBIDITY PRESENT (H): ICD-10-CM

## 2020-06-23 DIAGNOSIS — E04.1 THYROID NODULE: Primary | ICD-10-CM

## 2020-06-23 DIAGNOSIS — E66.813 CLASS 3 SEVERE OBESITY DUE TO EXCESS CALORIES WITH BODY MASS INDEX (BMI) OF 45.0 TO 49.9 IN ADULT, UNSPECIFIED WHETHER SERIOUS COMORBIDITY PRESENT (H): ICD-10-CM

## 2020-06-23 DIAGNOSIS — H18.892 CORNEAL IRRITATION OF LEFT EYE: ICD-10-CM

## 2020-06-23 DIAGNOSIS — G51.0 LEFT-SIDED BELL'S PALSY: ICD-10-CM

## 2020-06-23 PROCEDURE — 99214 OFFICE O/P EST MOD 30 MIN: CPT | Performed by: NURSE PRACTITIONER

## 2020-06-23 ASSESSMENT — MIFFLIN-ST. JEOR: SCORE: 1667.81

## 2020-06-23 NOTE — PATIENT INSTRUCTIONS
Patient Education     Friedman s Palsy    Bell's Palsy is a problem involving the nerve that controls the muscles on one side of the face.  In most cases, the cause is unknown, but may be related to inflammation of the nerve, diabetes, pregnancy, Lyme disease, and viral infections such as herpes or varicella. Symptoms usually appear only on one side. They may include:    Inability to close the eyelid    Tearing of the eye    Facial drooping    Drooling    Numbness or pain    Changes in taste    Sound sensitivity  Damage to the eye can be a serious problem. The inability to blink can cause the eye to dry out. An ulcer (sore) can then form on the cornea. Also, not blinking means that the eye has no protection from dirt and dust particles.  Treatment involves protecting and moistening the eye. Medicines, such as steroids, may also help.  Most people recover fully within 3 to 6 months. However, the condition sometimes returns months or years later.  Home care    Get plenty of rest and eat a healthy diet to help yourself recover.    Use artificial tears often during the day and at bedtime to prevent drying. These drops are available without prescription at your drug store.    Wear protective glasses especially when outside to protect from flying debris. Use sunglasses when outdoors.    Tape the eyelid closed at bedtime with a paper tape (available at your pharmacy). It has a very mild adhesive so won't injure the lid. This will protect your eye from injury while you sleep.    Sometimes medicines are prescribed to reduce inflammation or treat specific viral infections of the nerve. If medicines are prescribed, take them exactly as directed. Usually the sooner the medicines are started, the more effective they are. Taking this medicine as prescribed will help with a full recovery.    Use low heat, for example from a heating pad, on the affected area. This can help reduce pain and swelling.    If you have severe pain, contact  your healthcare provider.  Follow-up care  Follow up with your healthcare provider as advised. If you referred to a specialist, make that appointment promptly.  When to seek medical advice  Call your healthcare provider if any of the following occur:    Severe eye redness    Eye pain    Thick drainage from the eye    Change in vision (such as double vision or losing vision)    Fever over 100.4 F (38 C) or as directed by your healthcare provider    Headache, neck pain, weakness, trouble speaking or walking, or other unexplained symptoms  Date Last Reviewed: 3/1/2018    4497-5566 [employer]. [address] [place] [address]. All rights reserved. This information is not intended as a substitute for professional medical care. Always follow your healthcare professional's instructions.

## 2020-06-23 NOTE — PROGRESS NOTES
"Subjective     Nila Starks is a 45 year old female who presents to clinic today for the following health issues:    HPI   ED/UC Followup:    Facility:  [facility]   Date of visit: 2020  Reason for visit: Thyroid nodule/Duncan Palsy   Current Status: per patient she is \"ok\" no new sx to report.   Does state that she is also taking a Prednisone that is not visible on her medication list and was unable to be downloaded.     Patient presented to ED  via EMS due to sudden onset left-sided weakness left face, noticed in the mirror when she went to the bathroom.  She describes a numb feeling of the face as well as the left side of her tongue, numbness in her left arm hand and left lower leg.  She denies actual left arm or left leg weakness or clumsiness, she was able to walk from the bathroom back to her desk.  She called nurse advice line who recommended she call 911.  Medics note left facial droop and drooling.  They noted no extremity weakness    Stroke code initiated - CT's negative except for thyroid nodule measuring 4.1 cm.  Recommended Thyroid US to further evaluate.    MRI Brain done as well -   Impression      IMPRESSION:  1. No evidence of acute ischemia or hemorrhage.  2. Slight nonspecific asymmetric enhancement of the geniculate segment  of the left facial nerve. Infectious/inflammatory neuritis (Bell's  palsy versus Hatfield Casas) cannot be excluded.     Treated with Valtrex and Prednisone.      Patient Active Problem List   Diagnosis     Hypertrophy of tonsils alone     Morbid obesity (H)     HIEN (obstructive sleep apnea)     Gastroesophageal reflux disease without esophagitis     Numbness and tingling     Past Surgical History:   Procedure Laterality Date     C  DELIVERY ONLY  3/98, ,      C ORAL SURGERY PROCEDURE       HC DILATION/CURETTAGE DIAG/THER NON OB  2002    Missed Ab       Social History     Tobacco Use     Smoking status: Former Smoker     Types: Cigarettes     Smokeless " tobacco: Never Used     Tobacco comment: smoked in college lightly   Substance Use Topics     Alcohol use: No     Family History   Problem Relation Age of Onset     Diabetes Mother         type 2     Diabetes Father         type 2     Hypertension Father      Prostate Cancer Father      Prostate Problems Father      Skin Cancer Sister      Diabetes Sister          Current Outpatient Medications   Medication Sig Dispense Refill     valACYclovir (VALTREX) 1000 mg tablet Take 1 tablet (1,000 mg) by mouth 3 times daily for 7 days 21 tablet 0     meclizine (ANTIVERT) 25 MG tablet Take 1 tablet (25 mg) by mouth 3 times daily as needed for dizziness (vertigo) (Patient not taking: Reported on 6/23/2020) 21 tablet 1     NO ACTIVE MEDICATIONS . 0 0     VITAMIN D, CHOLECALCIFEROL, PO Take 5,000 Units by mouth daily       VITAMIN E BLEND PO Take 100 Units by mouth daily as needed        Allergies   Allergen Reactions     Penicillins Rash     Recent Labs   Lab Test 06/19/20  1017 01/23/19  1140   A1C  --  5.6   LDL  --  57   HDL  --  27*   TRIG  --  238*   CR 0.72  --    GFRESTIMATED >90  --    GFRESTBLACK >90  --    POTASSIUM 3.3*  --    TSH  --  1.80      BP Readings from Last 3 Encounters:   06/23/20 (!) 142/78   06/19/20 126/86   06/21/19 148/86    Wt Readings from Last 3 Encounters:   06/23/20 111.1 kg (245 lb)   06/19/20 108.4 kg (239 lb)   06/21/19 111.6 kg (246 lb)                    Reviewed and updated as needed this visit by Provider         Review of Systems   Constitutional, HEENT, cardiovascular, pulmonary, GI, , musculoskeletal, neuro, skin, endocrine and psych systems are negative, except as otherwise noted.      Objective    BP (!) 142/78 (BP Location: Right arm, Patient Position: Sitting, Cuff Size: Adult Large)   Pulse 76   Temp 98.4  F (36.9  C) (Tympanic)   Resp 12   Ht 1.524 m (5')   Wt 111.1 kg (245 lb)   SpO2 97%   BMI 47.85 kg/m    Body mass index is 47.85 kg/m .  Physical Exam   GENERAL: alert  and no distress  EYES: PERRL, EOMI and left eyelid unable to close, left conjunctiva with clear drainage.  NECK: no adenopathy, no asymmetry, masses, or scars and thyromegaly approximately 1 times normal  RESP: lungs clear to auscultation - no rales, rhonchi or wheezes  CV: regular rate and rhythm, normal S1 S2, no S3 or S4, no murmur, click or rub, no peripheral edema and peripheral pulses strong  ABDOMEN: soft, nontender, no hepatosplenomegaly, no masses and bowel sounds normal  MS: no gross musculoskeletal defects noted, no edema  NEURO: Normal tone,  grossly normal, mentation intact and noted left mild facial droop.  No left arm or leg weakness appreciated.  Sensory exam normal bilateral. Gait normal.    Diagnostic Test Results:  Labs reviewed in Epic        Assessment & Plan     1. Thyroid nodule   Noted on CT during ED admit.  Follow-up on this.  TSH ordered.    - US Thyroid; Future  - TSH with free T4 reflex; Future    2. Left-sided Bell's palsy  Discussed course and treatment.  Continue Prednisone and anti viral.  Follow-up with Ophthalmology for ? Prednisone gtts    - Lyme Confirm IgG by Immunoblot; Future  - OPHTHALMOLOGY ADULT REFERRAL    3. Corneal irritation of left eye     - OPHTHALMOLOGY ADULT REFERRAL    4. Class 3 severe obesity due to excess calories with body mass index (BMI) of 45.0 to 49.9 in adult, unspecified whether serious comorbidity present (H)          BMI:   Estimated body mass index is 47.85 kg/m  as calculated from the following:    Height as of this encounter: 1.524 m (5').    Weight as of this encounter: 111.1 kg (245 lb).   Weight management plan: Patient was referred to their PCP to discuss a diet and exercise plan.        Patient Instructions     Patient Education     Friedman s Palsy    Bell's Palsy is a problem involving the nerve that controls the muscles on one side of the face.  In most cases, the cause is unknown, but may be related to inflammation of the nerve, diabetes,  pregnancy, Lyme disease, and viral infections such as herpes or varicella. Symptoms usually appear only on one side. They may include:    Inability to close the eyelid    Tearing of the eye    Facial drooping    Drooling    Numbness or pain    Changes in taste    Sound sensitivity  Damage to the eye can be a serious problem. The inability to blink can cause the eye to dry out. An ulcer (sore) can then form on the cornea. Also, not blinking means that the eye has no protection from dirt and dust particles.  Treatment involves protecting and moistening the eye. Medicines, such as steroids, may also help.  Most people recover fully within 3 to 6 months. However, the condition sometimes returns months or years later.  Home care    Get plenty of rest and eat a healthy diet to help yourself recover.    Use artificial tears often during the day and at bedtime to prevent drying. These drops are available without prescription at your drug store.    Wear protective glasses especially when outside to protect from flying debris. Use sunglasses when outdoors.    Tape the eyelid closed at bedtime with a paper tape (available at your pharmacy). It has a very mild adhesive so won't injure the lid. This will protect your eye from injury while you sleep.    Sometimes medicines are prescribed to reduce inflammation or treat specific viral infections of the nerve. If medicines are prescribed, take them exactly as directed. Usually the sooner the medicines are started, the more effective they are. Taking this medicine as prescribed will help with a full recovery.    Use low heat, for example from a heating pad, on the affected area. This can help reduce pain and swelling.    If you have severe pain, contact your healthcare provider.  Follow-up care  Follow up with your healthcare provider as advised. If you referred to a specialist, make that appointment promptly.  When to seek medical advice  Call your healthcare provider if any of the  following occur:    Severe eye redness    Eye pain    Thick drainage from the eye    Change in vision (such as double vision or losing vision)    Fever over 100.4 F (38 C) or as directed by your healthcare provider    Headache, neck pain, weakness, trouble speaking or walking, or other unexplained symptoms  Date Last Reviewed: 3/1/2018    6916-9341 [employer]. [address] [place] [address]. All rights reserved. This information is not intended as a substitute for professional medical care. Always follow your healthcare professional's instructions.               Return if symptoms worsen or fail to improve.    Maddie Mazariegos NP  [facility]

## 2020-06-28 ENCOUNTER — TELEPHONE (OUTPATIENT)
Dept: FAMILY MEDICINE | Facility: CLINIC | Age: 45
End: 2020-06-28

## 2020-08-05 ENCOUNTER — TELEPHONE (OUTPATIENT)
Dept: FAMILY MEDICINE | Facility: CLINIC | Age: 45
End: 2020-08-05

## 2020-08-05 NOTE — LETTER
August 5, 2020      Nila Starks  [address] [facility] [address]        To Whom It May Concern:    Nila Starks  was seen on 6/23/2010.  Please allow her to return to work  8/10/2020 without restrictions.        Sincerely,        Maddie Mazariegos NP

## 2020-08-05 NOTE — TELEPHONE ENCOUNTER
Reason for Call:  Other letter    Detailed comments: Letter stating that she is ok to go back to work on 8/10/2020. And if there is or isn't not restriction put into the letter. Patient will get the fax number when we call back.    Phone Number Patient can be reached at: Home number on file [phone] (home)    Best Time: any    Can we leave a detailed message on this number? YES    Call taken on 8/5/2020 at 10:38 AM by Miya Davis

## 2020-08-05 NOTE — TELEPHONE ENCOUNTER
[facility] paperwork recently filled out. Patient requests letter to go back to work 8/10/20. Letter pended.      MAX BlantonN, RN

## 2020-08-14 ENCOUNTER — APPOINTMENT (OUTPATIENT)
Dept: ULTRASOUND IMAGING | Facility: CLINIC | Age: 45
End: 2020-08-14
Attending: EMERGENCY MEDICINE
Payer: COMMERCIAL

## 2020-08-14 ENCOUNTER — NURSE TRIAGE (OUTPATIENT)
Dept: FAMILY MEDICINE | Facility: CLINIC | Age: 45
End: 2020-08-14

## 2020-08-14 ENCOUNTER — HOSPITAL ENCOUNTER (EMERGENCY)
Facility: CLINIC | Age: 45
Discharge: HOME | End: 2020-08-14
Attending: EMERGENCY MEDICINE | Admitting: EMERGENCY MEDICINE
Payer: COMMERCIAL

## 2020-08-14 VITALS
TEMPERATURE: 97.6 F | OXYGEN SATURATION: 97 % | RESPIRATION RATE: 18 BRPM | HEART RATE: 90 BPM | SYSTOLIC BLOOD PRESSURE: 136 MMHG | BODY MASS INDEX: 45.9 KG/M2 | WEIGHT: 235 LBS | DIASTOLIC BLOOD PRESSURE: 68 MMHG

## 2020-08-14 DIAGNOSIS — Z87.42 HISTORY OF DYSPAREUNIA IN FEMALE: ICD-10-CM

## 2020-08-14 DIAGNOSIS — N93.9 VAGINAL BLEEDING: ICD-10-CM

## 2020-08-14 LAB
BASOPHILS # BLD AUTO: 0.1 10E9/L (ref 0–0.2)
BASOPHILS NFR BLD AUTO: 0.9 %
DIFFERENTIAL METHOD BLD: ABNORMAL
EOSINOPHIL # BLD AUTO: 0.4 10E9/L (ref 0–0.7)
EOSINOPHIL NFR BLD AUTO: 3.5 %
ERYTHROCYTE [DISTWIDTH] IN BLOOD BY AUTOMATED COUNT: 18.1 % (ref 10–15)
HCG SERPL QL: NEGATIVE
HCT VFR BLD AUTO: 38.8 % (ref 35–47)
HGB BLD-MCNC: 12.6 G/DL (ref 11.7–15.7)
IMM GRANULOCYTES # BLD: 0.1 10E9/L (ref 0–0.4)
IMM GRANULOCYTES NFR BLD: 0.5 %
LYMPHOCYTES # BLD AUTO: 3.3 10E9/L (ref 0.8–5.3)
LYMPHOCYTES NFR BLD AUTO: 27.2 %
MCH RBC QN AUTO: 25.6 PG (ref 26.5–33)
MCHC RBC AUTO-ENTMCNC: 32.5 G/DL (ref 31.5–36.5)
MCV RBC AUTO: 79 FL (ref 78–100)
MONOCYTES # BLD AUTO: 0.8 10E9/L (ref 0–1.3)
MONOCYTES NFR BLD AUTO: 6.5 %
NEUTROPHILS # BLD AUTO: 7.5 10E9/L (ref 1.6–8.3)
NEUTROPHILS NFR BLD AUTO: 61.4 %
NRBC # BLD AUTO: 0 10*3/UL
NRBC BLD AUTO-RTO: 0 /100
PLATELET # BLD AUTO: 403 10E9/L (ref 150–450)
RBC # BLD AUTO: 4.92 10E12/L (ref 3.8–5.2)
WBC # BLD AUTO: 12.2 10E9/L (ref 4–11)

## 2020-08-14 PROCEDURE — 85025 COMPLETE CBC W/AUTO DIFF WBC: CPT | Performed by: EMERGENCY MEDICINE

## 2020-08-14 PROCEDURE — 76830 TRANSVAGINAL US NON-OB: CPT

## 2020-08-14 PROCEDURE — 99285 EMERGENCY DEPT VISIT HI MDM: CPT | Mod: Z6 | Performed by: EMERGENCY MEDICINE

## 2020-08-14 PROCEDURE — 84703 CHORIONIC GONADOTROPIN ASSAY: CPT | Performed by: EMERGENCY MEDICINE

## 2020-08-14 PROCEDURE — 99284 EMERGENCY DEPT VISIT MOD MDM: CPT | Mod: 25 | Performed by: EMERGENCY MEDICINE

## 2020-08-14 RX ORDER — MEDROXYPROGESTERONE ACETATE 10 MG
10 TABLET ORAL DAILY
Qty: 30 TABLET | Refills: 0 | Status: SHIPPED | OUTPATIENT
Start: 2020-08-14 | End: 2020-08-25

## 2020-08-14 ASSESSMENT — ENCOUNTER SYMPTOMS
CONSTITUTIONAL NEGATIVE: 1
EYES NEGATIVE: 1
PSYCHIATRIC NEGATIVE: 1
MUSCULOSKELETAL NEGATIVE: 1
CARDIOVASCULAR NEGATIVE: 1
ALLERGIC/IMMUNOLOGIC NEGATIVE: 1
ENDOCRINE NEGATIVE: 1
HEMATOLOGIC/LYMPHATIC NEGATIVE: 1
GASTROINTESTINAL NEGATIVE: 1
RESPIRATORY NEGATIVE: 1
NEUROLOGICAL NEGATIVE: 1

## 2020-08-14 NOTE — ED AVS SNAPSHOT
[facility]  [address] [address]  Phone:  [phone]  Fax:  [phone]                                    Nila Starks   MRN: [id number]    Department:  [facility]   Date of Visit:  8/14/2020           After Visit Summary Signature Page    I have received my discharge instructions, and my questions have been answered. I have discussed any challenges I see with this plan with the nurse or doctor.    ..........................................................................................................................................  Patient/Patient Representative Signature      ..........................................................................................................................................  Patient Representative Print Name and Relationship to Patient    ..................................................               ................................................  Date                                   Time    ..........................................................................................................................................  Reviewed by Signature/Title    ...................................................              ..............................................  Date                                               Time          22EPIC Rev 08/18

## 2020-08-14 NOTE — LETTER
August 14, 2020      To Whom It May Concern:      Nila Starks was seen in our Emergency Department today, 08/14/20.  I expect her condition to improve over the next few days.  She may return to work when improved.  Please excuse her from missing work due to her visit in the department.  This work excuse is valid until August 17, 2020.    Sincerely,         Miki De Santiago MD

## 2020-08-14 NOTE — TELEPHONE ENCOUNTER
Reason for call:  Patient reporting a symptom    Symptom or request: patient has been having  vaginal bleeding for a week     Duration (how long have symptoms been present): 1 week    Have you been treated for this before? No    Phone Number patient can be reached at:  Home number on file [phone] (home)    Best Time:  any    Can we leave a detailed message on this number:  YES    Call taken on 8/14/2020 at 2:06 PM by Miya Davis

## 2020-08-14 NOTE — TELEPHONE ENCOUNTER
"Patient is sharing a car with her  will call  to take her to the Emergency Department.     Additional Information    SEVERE vaginal bleeding (i.e., soaking 2 pads or tampons per hour and present 2 or more hours; 1 menstrual cup every 2 hours)    Negative: MODERATE vaginal bleeding (i.e., soaking pad or tampon per hour and present > 6 hours; 1 menstrual cup every 6 hours)    Negative: Pale skin (pallor) of new onset or worsening    Negative: Constant abdominal pain lasting > 2 hours    Negative: Patient sounds very sick or weak to the triager    Negative: Taking Coumadin (warfarin) or other strong blood thinner, or known bleeding disorder (e.g., thrombocytopenia)    Negative: Skin bruises or nosebleed and not caused by an injury    Negative: Bleeding/spotting after procedure (e.g., biopsy) or pelvic examination (e.g., pap smear) that persists > 3 days    Negative: Patient wants to be seen    Negative: SEVERE abdominal pain (e.g., excruciating)    Negative: SEVERE dizziness (e.g., unable to stand, requires support to walk, feels like passing out now)    Negative: Pregnant > 20 weeks (5 months or more)    Negative: Pregnant < 20 weeks (less than 5 months)    Negative: Postpartum (from 0 to 6 weeks after delivery)    Negative: Vaginal discharge is the main symptom and bleeding is slight    Negative: SEVERE vaginal bleeding (e.g., continuous red blood from vagina, or large blood clots) and very weak (can't stand)    Negative: Passed out (i.e., fainted, collapsed and was not responding)    Negative: Difficult to awaken or acting confused (e.g., disoriented, slurred speech)    Negative: Shock suspected (e.g., cold/pale/clammy skin, too weak to stand, low BP, rapid pulse)    Negative: Sounds like a life-threatening emergency to the triager    Answer Assessment - Initial Assessment Questions  1. AMOUNT: \"Describe the bleeding that you are having.\"     - SPOTTING: spotting, or pinkish / brownish mucous " "discharge; does not fill panti-liner or pad     - MILD:  less than 1 pad / hour; less than patient's usual menstrual bleeding    - MODERATE: 1-2 pads / hour; 1 menstrual cup every 6 hours; small-medium blood clots (e.g., pea, grape, small coin)    - SEVERE: soaking 2 or more pads/hour for 2 or more hours; 1 menstrual cup every 2 hours; bleeding not contained by pads or continuous red blood from vagina; large blood clots (e.g., golf ball, large coin)       severe  2. ONSET: \"When did the bleeding begin?\" \"Is it continuing now?\"      1 week ago.   3. MENSTRUAL PERIOD: \"When was the last normal menstrual period?\" \"How is this different than your period?\"      March, 2020.   4. REGULARITY: \"How regular are your periods?\"      Patient thought she was done having periods but came back after 6 months.   5. ABDOMINAL PAIN: \"Do you have any pain?\" \"How bad is the pain?\"  (e.g., Scale 1-10; mild, moderate, or severe)    - MILD (1-3): doesn't interfere with normal activities, abdomen soft and not tender to touch     - MODERATE (4-7): interferes with normal activities or awakens from sleep, tender to touch     - SEVERE (8-10): excruciating pain, doubled over, unable to do any normal activities       Moderate.   6. PREGNANCY: \"Could you be pregnant?\" \"Are you sexually active?\" \"Did you recently give birth?\"      Patient states she had protected intercourse prior to the bleeding starting and is concerned may be from intercourse.   7. BREASTFEEDING: \"Are you breastfeeding?\"      No.   8. HORMONES: \"Are you taking any hormone medications, prescription or OTC?\" (e.g., birth control pills, estrogen)      *No Answer*  9. BLOOD THINNERS: \"Do you take any blood thinners?\" (e.g., Coumadin/warfarin, Pradaxa/dabigatran, aspirin)      *No Answer*  10. CAUSE: \"What do you think is causing the bleeding?\" (e.g., recent gyn surgery, recent gyn procedure; known bleeding disorder, cervical cancer, polycystic ovarian disease, fibroids)          " "*No Answer*  11. HEMODYNAMIC STATUS: \"Are you weak or feeling lightheaded?\" If so, ask: \"Can you stand and walk normally?\"         *No Answer*  12. OTHER SYMPTOMS: \"What other symptoms are you having with the bleeding?\" (e.g., passed tissue, vaginal discharge, fever, menstrual-type cramps)        *No Answer*    Protocols used: VAGINAL BLEEDING - [id number]    "

## 2020-08-14 NOTE — DISCHARGE INSTRUCTIONS
1) Your evaluation today does not suggest a cause of your vaginal bleeding.  Pelvic ultrasound showed a normal endometrial thickness and a stable size of your uterine fibroid.    2) Your presentation and ultrasound findings was reviewed the gynecologist on duty who advised trial of provera to see if this will help stop the bleeding.    3) Take Provera 10 mg  (1 pill )daily until follow-up in gynecology clinic.  If after taking Provera for the next 2 to 3 days your bleeding does not slow stop consider increasing to 20 mg (2 pills) daily until bleeding slows and then returned to 10 mg 1 pill a day until your follow-up appointment.    4) if you develop new symptoms of concern you should return to be reevaluated.  You should be called for follow-up appointment by the gynecology clinic staff.    5) Be sure to follow-up on thyroid ultrasound as recommended from your visit on June 2020.  We also discussed the need to follow-up on your elevated blood pressure readings.

## 2020-08-14 NOTE — ED PROVIDER NOTES
History     Chief Complaint   Patient presents with     Vaginal Bleeding     2 weeks with clots after no bleeding since march HPI  Nila Starks is a 45 year old female who presents with concern about vaginal bleeding over the last 2 weeks.  Patient reports she did not have a period for 5 months since March 2020.  On arrival in the department she reports after sexual intercourse with her  2 weeks ago she developed progressive vaginal bleeding now with passage of clots.  She reports some pelvic discomfort. She reports no abnormal vaginal discharge.  She is uncertain if she could be pregnant but reports no prior history genitourinary surgery.  She reports she takes no active prescriptions and is not on oral contraception.  She was recently evaluated for an episode of left-sided facial weakness and numbness in June 2002 and treated for Bell's palsy with prednisone and valcyclovir.  She was noted to have a thyroid nodule and asked to follow-up. She has yet to follow up as recommended for an ultrasound but reports she missed several phone calls from the clinic for this and intends to follow-up on this.  Patient has a history of morbid obesity, obstructive sleep apnea, GERD she reports she takes no active prescriptions currently.  Patient reports she did have some abnormal bleeding with variation in her periods between December 2019 and March 2020.  Before she went for 5 months without having a period.    Chart Review  US PELVIC COMPLETE WITH TRANSVAGINAL 10/17/2019 6:58 PM     HISTORY: Dyspareunia.     TECHNIQUE: Transabdominal images of the pelvis are supplemented with  endovaginal images to better define anatomy.     COMPARISON: None.     FINDINGS: The uterus challenging to optimally visualize due to the  patient's body habitus and lack of bladder distention. It measures  12.1 x 5.9 x 6.3cm.  There is an intramural fibroid in the posterior  mid uterine segment that measures 3.1 cm. The endometrium  measures 1.2  cm. The right ovary is not visualized due to obscuration by overlying  bowel gas. No right adnexal masses are seen. The left ovary shows a  collapsing dominant follicle or small cyst that measures 1.6 cm. There  is no free pelvic fluid.                                                                    IMPRESSION:  1. There is a collapsing dominant follicle or small cyst in the left  ovary.  2. A single intramural fibroid is demonstrated.     YESICA العراقي MD.      Allergies:  Allergies   Allergen Reactions     Penicillins Rash       Problem List:    Patient Active Problem List    Diagnosis Date Noted     Morbid obesity (H) 2019     Priority: Medium     HIEN (obstructive sleep apnea) 2019     Priority: Medium     Mild to moderate HIEN with mild sleep-associated hypoxemia   - Potential that severity under-reported given no supine REM observed    Polysomnography - Test date 2019    Weight 246 lbs    Sleep latency 38.9 minutes without Sleep Aid.  REM achieved with latency of 233 minutes.  Sleep efficiency 68%. Total sleep time 296 minutes.    Sleep architecture:  Stage 1, 12% (5%), stage 2, 60% (45-55%), stage 3, 13% (15-20%), stage REM, 15% (20-25%).  AHI was 6.9, with desaturations down to 80%. RDI 22.9.  REM AHI 39.1, consistent with severe REM HIEN.       CPAP titration:  Not performed         Gastroesophageal reflux disease without esophagitis 2019     Priority: Medium     Numbness and tingling 2019     Priority: Medium     Hypertrophy of tonsils alone 12/15/2007     Priority: Medium        Past Medical History:    Past Medical History:   Diagnosis Date     Abnormal maternal glucose tolerance, complicating pregnancy, childbirth, or the puerperium, unspecified as to episode of care      Unspecified sleep apnea        Past Surgical History:    Past Surgical History:   Procedure Laterality Date     C  DELIVERY ONLY  3/98, ,      C ORAL SURGERY PROCEDURE       HC  DILATION/CURETTAGE DIAG/THER NON OB  6/2002    Missed Ab       Family History:    Family History   Problem Relation Age of Onset     Diabetes Mother         type 2     Diabetes Father         type 2     Hypertension Father      Prostate Cancer Father      Prostate Problems Father      Skin Cancer Sister      Diabetes Sister        Social History:  Marital Status:   [2]  Social History     Tobacco Use     Smoking status: Former Smoker     Types: Cigarettes     Smokeless tobacco: Never Used     Tobacco comment: smoked in college lightly   Substance Use Topics     Alcohol use: No     Drug use: No        Medications:    medroxyPROGESTERone (PROVERA) 10 MG tablet  meclizine (ANTIVERT) 25 MG tablet  NO ACTIVE MEDICATIONS  valACYclovir (VALTREX) 1000 mg tablet  VITAMIN D, CHOLECALCIFEROL, PO  VITAMIN E BLEND PO          Review of Systems   Constitutional: Negative.    HENT: Negative.    Eyes: Negative.    Respiratory: Negative.    Cardiovascular: Negative.    Gastrointestinal: Negative.    Endocrine: Negative.    Genitourinary: Positive for pelvic pain and vaginal bleeding.   Musculoskeletal: Negative.    Skin: Negative.    Allergic/Immunologic: Negative.    Neurological: Negative.    Hematological: Negative.    Psychiatric/Behavioral: Negative.    All other systems reviewed and are negative.      Physical Exam   BP: (!) 155/81  Pulse: 90  Heart Rate: 70  Temp: 97.6  F (36.4  C)  Resp: 18  Weight: 106.6 kg (235 lb)  SpO2: 97 %      Physical Exam  Exam conducted with a chaperone present.   Constitutional:       General: She is not in acute distress.     Appearance: She is not ill-appearing, toxic-appearing or diaphoretic.   HENT:      Head: Normocephalic and atraumatic.      Nose: Nose normal.      Mouth/Throat:      Mouth: Mucous membranes are moist.   Eyes:      General: No scleral icterus.        Right eye: No discharge.         Left eye: No discharge.      Extraocular Movements: Extraocular movements intact.       Pupils: Pupils are equal, round, and reactive to light.   Neck:      Musculoskeletal: No neck rigidity or muscular tenderness.      Vascular: No carotid bruit.   Cardiovascular:      Rate and Rhythm: Normal rate and regular rhythm.   Pulmonary:      Effort: Pulmonary effort is normal. No respiratory distress.      Breath sounds: Normal breath sounds. No wheezing, rhonchi or rales.   Chest:      Chest wall: No tenderness.   Genitourinary:     General: Normal vulva.      Cervix: Cervical bleeding present.   Musculoskeletal:         General: No swelling, tenderness, deformity or signs of injury.      Right lower leg: No edema.      Left lower leg: No edema.   Lymphadenopathy:      Cervical: No cervical adenopathy.   Skin:     Capillary Refill: Capillary refill takes less than 2 seconds.      Coloration: Skin is not jaundiced or pale.      Findings: No bruising, erythema, lesion or rash.   Neurological:      General: No focal deficit present.      Mental Status: She is alert and oriented to person, place, and time.      Cranial Nerves: No cranial nerve deficit.      Sensory: No sensory deficit.      Motor: No weakness.      Coordination: Coordination normal.      Gait: Gait normal.      Deep Tendon Reflexes: Reflexes normal.   Psychiatric:         Mood and Affect: Mood normal.         Behavior: Behavior normal.         Thought Content: Thought content normal.         Judgment: Judgment normal.         ED Course        Procedures               Critical Care time:  none               ED medications: none      ED Vitals:  Vitals:    08/14/20 1501 08/14/20 1731   BP: (!) 155/81 136/68   Pulse: 90    Resp: 18    Temp: 97.6  F (36.4  C)    TempSrc: Temporal    SpO2: 97%    Weight: 106.6 kg (235 lb)      ED labs and imaging:  Results for orders placed or performed during the hospital encounter of 08/14/20   Pelvis w/Transvaginal     Status: None    Narrative    US PELVIC COMPLETE WITH TRANSVAGINAL 8/14/2020 4:11 PM    CLINICAL  HISTORY: Two-week history of abnormal vaginal bleeding.   History of intramural fibroid on ultrasound (from 10/17/2019)-please  compare for interval change evaluate for acute process.    TECHNIQUE: Transabdominal scans were performed. Endovaginal ultrasound  was performed to better visualize the adnexa.    COMPARISON: Pelvic ultrasound 10/17/2019.    FINDINGS:    UTERUS: Measures 12 x 5.4 x 6.1 cm. There is a 2.0 x 2.0 x 2.1 cm mid  right intramural fibroid.    ENDOMETRIUM: 11 mm. Normal smooth endometrium.    RIGHT OVARY: Not able to be visualized, potentially obscured by bowel  or body habitus.    LEFT OVARY: Not able to be visualized, potentially obscured by bowel  or body habitus.    No significant free fluid.      Impression    IMPRESSION:  1.  Endometrium is within normal limits for size assuming a  premenopausal patient. However, if bleeding continues further workup  may be necessary.  2.  Small uterine fibroid.  3.  Right and left ovaries cannot be visualized for assessment.      CARYL VARGAS MD   CBC with platelets differential     Status: Abnormal   Result Value Ref Range    WBC 12.2 (H) 4.0 - 11.0 10e9/L    RBC Count 4.92 3.8 - 5.2 10e12/L    Hemoglobin 12.6 11.7 - 15.7 g/dL    Hematocrit 38.8 35.0 - 47.0 %    MCV 79 78 - 100 fl    MCH 25.6 (L) 26.5 - 33.0 pg    MCHC 32.5 31.5 - 36.5 g/dL    RDW 18.1 (H) 10.0 - 15.0 %    Platelet Count 403 150 - 450 10e9/L    Diff Method Automated Method     % Neutrophils 61.4 %    % Lymphocytes 27.2 %    % Monocytes 6.5 %    % Eosinophils 3.5 %    % Basophils 0.9 %    % Immature Granulocytes 0.5 %    Nucleated RBCs 0 0 /100    Absolute Neutrophil 7.5 1.6 - 8.3 10e9/L    Absolute Lymphocytes 3.3 0.8 - 5.3 10e9/L    Absolute Monocytes 0.8 0.0 - 1.3 10e9/L    Absolute Eosinophils 0.4 0.0 - 0.7 10e9/L    Absolute Basophils 0.1 0.0 - 0.2 10e9/L    Abs Immature Granulocytes 0.1 0 - 0.4 10e9/L    Absolute Nucleated RBC 0.0    HCG qualitative pregnancy (blood)     Status: None    Result Value Ref Range    HCG Qualitative Serum Negative NEG^Negative           Assessments & Plan (with Medical Decision Making)   Clinical impression: 45-year-old female who presented with concern about postcoital vaginal bleeding over the last 2 weeks.  She has a history of dyspareunia and prior pelvic ultrasound showing an intramural uterine fibroid. Bleeding today maybe perimenopausal with report of a irregular cycle between December 2019 and March 2020.  She is discharged home with plan for close follow-up in gynecology clinic after reassuring pelvic ultrasound. Chaperoned pelvic exam- with Anushka Becker today revealed-a limited speculum examination due to pain and discomfort with active bleeding.  The cervical os was not visualized.  There was no labial lacerations or ulcerations and no abnormal vaginal discharge. She was otherwise hemodynamically normal.    ED course and Plan:  Reviewed the medical record.  Patient was evaluated on June 19, 2020 for episode of facial weakness-and diagnosed with Bell's palsy- with mild inflammatory changes noted about the geniculate segment of the left facial nerve on MRI imaging.  Patient was treated with Valcyclovir and steroids. A left thyroid nodule was noted on imaging during evaluation on June 19, 2020- a follow-up thyroid ultrasound was recommended which patient admits she has yet to follow-up on but intends to follow-up as recommended. Review of  the medical record.  Her hemoglobin was 12.3 in June 19, 2020.  Her blood type is O+ from July 24, 2006.  Patient had a pelvic ultrasound on October 2019 for dyspareunia and was noted to have an intramural fibroid.  With limited speculum exam comparison pelvic ultrasound was obtained today to evaluate for interval change.  Work-up today in the department revealed a stable hemogram.  Hemoglobin was 12.6.  Negative serum hCG. Pelvic ultrasound today showed a endometrium within normal limits in size assuming a premenopausal  patient.  If bleeding continues further work-up may be necessary per the interpreting radiologist.  There is a small uterine fibroids.  The ovaries were not well visualized as this was a study obtained with low concern for ovarian torsion.   I reviewed presentation with Dr Arreaga- GYN on-call at 4.55pm. We reviewed patient's pelvic ultrasound findings and her history of presentation.  Reviewed options for care and next steps for care.  Dr Arreaga advised that  patient begin Provera 10 mg daily may increase to 20 mg daily if the persists, and then taper down to 10 mg/day until follow-up in gynecology clinic (she was given a 30 day supply).  She advised holding on TXA unless Provera does not work and patient may call the clinic and directions to start TXA may be added. I sent an Epic in-basket message to the RN-OB pool to help with coordinating outpatient follow-up.   Her blood pressure was elevated during her ED course.  She reported she had been notified that her blood pressures have been elevated but has not been started on any medications for her blood pressure.  We discussed the need to follow-up on concern for possible untreated hypertension.  Discussed reviewed reasons to return to the department to be reevaluated patient expressed comfort, understanding and agreement of plan of care.        Disclaimer: This note consists of symbols derived from keyboarding, dictation and/or voice recognition software. As a result, there may be errors in the script that have gone undetected. Please consider this when interpreting information found in this chart.  I have reviewed the nursing notes.    I have reviewed the findings, diagnosis, plan and need for follow up with the patient.       Discharge Medication List as of 8/14/2020  5:27 PM      START taking these medications    Details   medroxyPROGESTERone (PROVERA) 10 MG tablet Take 1 tablet (10 mg) by mouth daily, Disp-30 tablet,R-0, E-Prescribe             Final  diagnoses:   Vaginal bleeding - Post-coital- over the last 2 weeks   History of dyspareunia in female       8/14/2020   [facility] EMERGENCY DEPARTMENT     Jason De Santiago MD  08/14/20 2568

## 2020-08-17 ENCOUNTER — TELEPHONE (OUTPATIENT)
Dept: OBGYN | Facility: CLINIC | Age: 45
End: 2020-08-17

## 2020-08-17 NOTE — TELEPHONE ENCOUNTER
Reason for Call:  Other appointment    Detailed comments: Pt has appt. 09/11 but wants to be seen sooner, was seen in ER over week end with heavy bleeding, please call     Phone Number Patient can be reached at: Home number on file [phone] (home)    Best Time: today    Can we leave a detailed message on this number? YES    Call taken on 8/17/2020 at 8:36 AM by Lo Braswell

## 2020-08-17 NOTE — TELEPHONE ENCOUNTER
Appointment scheduled for 8/25/2020 at 1300.  Pt in agreement and reports understanding.    Zoila Canela   [facility]  RN

## 2020-08-25 ENCOUNTER — HOSPITAL ENCOUNTER (OUTPATIENT)
Dept: ULTRASOUND IMAGING | Facility: CLINIC | Age: 45
Discharge: HOME | End: 2020-08-25
Attending: NURSE PRACTITIONER | Admitting: NURSE PRACTITIONER
Payer: COMMERCIAL

## 2020-08-25 ENCOUNTER — TELEPHONE (OUTPATIENT)
Dept: OBGYN | Facility: CLINIC | Age: 45
End: 2020-08-25

## 2020-08-25 ENCOUNTER — OFFICE VISIT (OUTPATIENT)
Dept: OBGYN | Facility: CLINIC | Age: 45
End: 2020-08-25
Payer: COMMERCIAL

## 2020-08-25 ENCOUNTER — HOSPITAL ENCOUNTER (OUTPATIENT)
Facility: CLINIC | Age: 45
End: 2020-08-25
Attending: OBSTETRICS & GYNECOLOGY | Admitting: OBSTETRICS & GYNECOLOGY
Payer: COMMERCIAL

## 2020-08-25 VITALS
HEIGHT: 60 IN | RESPIRATION RATE: 20 BRPM | SYSTOLIC BLOOD PRESSURE: 148 MMHG | TEMPERATURE: 98.6 F | HEART RATE: 97 BPM | BODY MASS INDEX: 48.61 KG/M2 | DIASTOLIC BLOOD PRESSURE: 73 MMHG | WEIGHT: 247.6 LBS

## 2020-08-25 DIAGNOSIS — N92.4 EXCESSIVE BLEEDING IN PREMENOPAUSAL PERIOD: ICD-10-CM

## 2020-08-25 DIAGNOSIS — N92.4 EXCESSIVE BLEEDING IN PREMENOPAUSAL PERIOD: Primary | ICD-10-CM

## 2020-08-25 DIAGNOSIS — E04.1 THYROID NODULE: ICD-10-CM

## 2020-08-25 PROBLEM — G51.0 BELL'S PALSY: Status: ACTIVE | Noted: 2020-06-19

## 2020-08-25 LAB — HCG UR QL: NEGATIVE

## 2020-08-25 PROCEDURE — 76536 US EXAM OF HEAD AND NECK: CPT

## 2020-08-25 PROCEDURE — G0145 SCR C/V CYTO,THINLAYER,RESCR: HCPCS | Performed by: OBSTETRICS & GYNECOLOGY

## 2020-08-25 PROCEDURE — 58100 BIOPSY OF UTERUS LINING: CPT | Performed by: OBSTETRICS & GYNECOLOGY

## 2020-08-25 PROCEDURE — 81025 URINE PREGNANCY TEST: CPT | Performed by: OBSTETRICS & GYNECOLOGY

## 2020-08-25 PROCEDURE — 99205 OFFICE O/P NEW HI 60 MIN: CPT | Mod: 25 | Performed by: OBSTETRICS & GYNECOLOGY

## 2020-08-25 PROCEDURE — G0124 SCREEN C/V THIN LAYER BY MD: HCPCS | Performed by: OBSTETRICS & GYNECOLOGY

## 2020-08-25 PROCEDURE — 88305 TISSUE EXAM BY PATHOLOGIST: CPT | Performed by: OBSTETRICS & GYNECOLOGY

## 2020-08-25 PROCEDURE — 87624 HPV HI-RISK TYP POOLED RSLT: CPT | Performed by: OBSTETRICS & GYNECOLOGY

## 2020-08-25 RX ORDER — TRANEXAMIC ACID 650 MG/1
1300 TABLET ORAL 2 TIMES DAILY
Qty: 30 TABLET | Refills: 1 | Status: SHIPPED | OUTPATIENT
Start: 2020-08-25 | End: 2022-03-15

## 2020-08-25 RX ORDER — ACETAMINOPHEN 325 MG/1
975 TABLET ORAL ONCE
Status: CANCELLED | OUTPATIENT
Start: 2020-08-25 | End: 2020-08-25

## 2020-08-25 ASSESSMENT — MIFFLIN-ST. JEOR: SCORE: 1679.61

## 2020-08-25 NOTE — PATIENT INSTRUCTIONS
Expect a phone call early tomorrow morning to confirm your procedure and to review possible COVID testing.       We are planning on a D&C hysteroscopy (clean out the uterus and look around with a camera).  I have you set for an ablation (burn the uterus), but that is not mandatory and you can opt out tomorrow if you decide to do so.

## 2020-08-25 NOTE — NURSING NOTE
Initial BP (!) 148/73 (BP Location: Left arm, Patient Position: Chair, Cuff Size: Adult Large)   Pulse 97   Temp 100.3  F (37.9  C) (Tympanic)   Resp 20   Ht 1.524 m (5')   Wt 112.3 kg (247 lb 9.6 oz)   LMP  (Within Weeks)   Breastfeeding No   BMI 48.36 kg/m   Estimated body mass index is 48.36 kg/m  as calculated from the following:    Height as of this encounter: 1.524 m (5').    Weight as of this encounter: 112.3 kg (247 lb 9.6 oz). .    Zoila Canela   [facility]  RN

## 2020-08-25 NOTE — PROGRESS NOTES
"Nila is a 45 year old  here for consultation at the request of ED for heavy vaginal bleeding.  Had regular menses until this past year.  She would skip occasional months.  Her last menses was March, but then her bleeding began again around .  It became gradually heavier, and she was seen in the ED for menorrhagia on 2020.  She was started on provera, but she had difficulty with side effects: irritable, anxious, \"like my insides are on fire,\" upset tummy, and palpitations.  She took for ~ 7 days with some improvement in bleeding, but discontinued and her bleeding resumed.  Currently, she is still bleeding with gushes throughout the day.     Gyne: last pap , no STDs, using condoms/abstinence for contraception.  Hx of elevated blood pressures but has not been on medications yet (borderline BP's)    TSH   Date Value Ref Range Status   2019 1.80 0.40 - 4.00 mU/L Final     Hemoglobin   Date Value Ref Range Status   2020 12.6 11.7 - 15.7 g/dL Final     Pelvic U/S:  UTERUS: Measures 12 x 5.4 x 6.1 cm. There is a 2.0 x 2.0 x 2.1 cm mid  right intramural fibroid.  ENDOMETRIUM: 11 mm. Normal smooth endometrium.  RIGHT OVARY: Not able to be visualized, potentially obscured by bowel  or body habitus.  LEFT OVARY: Not able to be visualized, potentially obscured by bowel  or body habitus.     No significant free fluid.                                                             IMPRESSION:  1.  Endometrium is within normal limits for size assuming a  premenopausal patient. However, if bleeding continues further workup  may be necessary.  2.  Small uterine fibroid.  3.  Right and left ovaries cannot be visualized for assessment.    ROS: Ten point review of systems was reviewed and negative except the above.    Patient Active Problem List   Diagnosis     Morbid obesity (H)     HIEN (obstructive sleep apnea)     Gastroesophageal reflux disease without esophagitis     Numbness and tingling     " Bell's palsy     Thyroid nodule     Excessive bleeding in premenopausal period     Past Medical History:   Diagnosis Date     Abnormal maternal glucose tolerance, complicating pregnancy, childbirth, or the puerperium, unspecified as to episode of care      Hypertrophy of tonsils alone 12/15/2007     Past Surgical History:   Procedure Laterality Date     C  DELIVERY ONLY  3/98, ,      HC DILATION/CURETTAGE DIAG/THER NON OB  2002    Missed Ab     TONSILLECTOMY         ALL/Meds: Her medication and allergy histories were reviewed and are documented in their appropriate chart areas.    Social History     Tobacco Use     Smoking status: Former Smoker     Types: Cigarettes     Smokeless tobacco: Never Used     Tobacco comment: smoked in [employer] lightly   Substance Use Topics     Alcohol use: No     Drug use: No       FH: Her family history was reviewed and documented in its appropriate chart area.    PE: BP (!) 148/73 (BP Location: Left arm, Patient Position: Chair, Cuff Size: Adult Large)   Pulse 97   Temp 98.6  F (37  C) (Oral)   Resp 20   Ht 1.524 m (5')   Wt 112.3 kg (247 lb 9.6 oz)   LMP  (Within Weeks)   Breastfeeding No   BMI 48.36 kg/m    Body mass index is 48.36 kg/m .    General Appearance:  healthy, alert, active, no distress  HEENT: NCAT  Abdomen: Soft, nontender.  Normal bowel sounds.  No masses  Pelvic:       - Ext: Normal external genitalia       - Urethral Meatus: normal appearance,        - Bladder: no tenderness, no masses       - Vagina: pink, moist, normal rugae, no lesions, large amount of menstrual blood (~ 30-50 ml)       - Cervix: no lesions, parous    Urine Pregnancy Test: negative   Endometrial biopsy was performed without complication.  Cervix was prepped with betadine. No tenaculum needed.  Pipelle sounded to 6.5 cm.  A representative sample was aspirated from the cavity and sent to pathology.          A/P     ICD-10-CM    1. Excessive bleeding in premenopausal period   N92.4 tranexamic acid (LYSTEDA) 650 MG tablet     TSH with free T4 reflex     Prolactin     Surgical pathology exam     HCG Qual, Urine ([id number])     Pap imaged thin layer screen with HPV - recommended age 30 - 65 years (select HPV order below)     HPV High Risk Types DNA Cervical     Case Request: HYSTEROSCOPY, WITH DILATION AND CURETTAGE OF UTERUS, AND ENDOMETRIAL ABLATION     Case Request: HYSTEROSCOPY, WITH DILATION AND CURETTAGE OF UTERUS, AND ENDOMETRIAL ABLATION       1. We reviewed multiple options including other hormonal medications (declines due to side effects), Mirena (declines due to dislike of foreign bodies), ablation, and hysterectomy (declines due to invasiveness and length of recovery).  Discussed that ablation should improve her bleeding ~90+% of the time, but that amenorrhea cannot be guaranteed.      Patient with active bleeding, but declines immediate invention surgically this evening.  Will try Lysteda overnight.  Given the amount of bleeding, we discussed plan for D&C hysteroscopy and possible ablation.  Will try and get on the OR schedule for tomorrow.     Nila Sterling M.D.    60 minutes was spent face to face with the patient today, not including time spent on biopsy, discussing her history, diagnosis, and follow-up plan as noted above.  Over 50% of the visit was spent in counseling and coordination of care.       Patient instructions given:   Patient Instructions   Expect a phone call early tomorrow morning to confirm your procedure and to review possible COVID testing.       We are planning on a D&C hysteroscopy (clean out the uterus and look around with a camera).  I have you set for an ablation (burn the uterus), but that is not mandatory and you can opt out tomorrow if you decide to do so.         [facility]  [address] [address]  [phone]  Dept: [phone]    PRE-OP EVALUATION:  Today's date: 2020    Nila Starks (:  1975) presents for pre-operative evaluation assessment.  She requires evaluation and anesthesia risk assessment prior to undergoing surgery/procedure for treatment of menorrhagia .    Proposed Surgery/ Procedure: D&C hysteroscopy with endometrial ablation  Date of Surgery/ Procedure: 8/26/20   Time of Surgery/ Procedure: 1100  Hospital/Surgical Facility: [facility]  Surgery Fax Number: Note does not need to be faxed, will be available electronically in Epic.  Primary Physician: Elvin VAZQUEZ  Type of Anesthesia Anticipated: Local with MAC    Preoperative Questionnaire:   No - Have you ever had a heart attack or stroke?  No - Have you ever had surgery on your heart or blood vessels, such as a stent, coronary (heart) bypass, or surgery on an artery in the head, neck, heart, or legs?  No - Do you have chest pain when you are physically active?  No - Do you have a history of heart failure?  No - Do you currently have a cold, bronchitis, or symptoms of other respiratory (head and chest) infections?  No - Do you have a cough, shortness of breath, or wheezing?  No - Do you or anyone in your family have a history of blood clots?  No - Do you or anyone in your family have a serious bleeding problem, such as long-lasting bleeding after surgeries or cuts?  No - Have you ever had anemia or been told to take iron pills?  No - Have you had any abnormal blood loss such as black, tarry or bloody stools, or abnormal vaginal bleeding?  YES - HAVE YOU HAD ANY ABNORMAL BLOOD LOSS SUCH AS BLACK, TARRY OR BLOODY STOOLS, OR ABNORMAL VAGINAL BLEEDING? Current bleeding episode  NO - ARE YOU WILLING TO HAVE A BLOOD TRANSFUSION IF IT IS MEDICALLY NEEDED BEFORE, DURING, OR AFTER YOUR SURGERY? declines  No - Have you or anyone in your family ever had problems with anesthesia (sedation for surgery)?  YES - DO YOU HAVE SLEEP APNEA, EXCESSIVE SNORING, OR DAYTIME DROWSINESS? Prior to tonsillectomy DO YOU HAVE A CPAP MACHINE? No longer needs  No - Do  you have any artifical heart valves or other implanted medical devices, such as a pacemaker, defibrillator, or continuous glucose monitor?  No - Do you have any artifical joints?  No - Are you allergic to latex?  No - Is there any chance that you may be pregnant?    Patient has a Health Care Directive or Living Will:  NO    HPI:     HPI related to upcoming procedure: see above      See problem list for active medical problems.  Problems all longstanding and stable, except as noted/documented.  See ROS for pertinent symptoms related to these conditions.      MEDICAL HISTORY:     Patient Active Problem List    Diagnosis Date Noted     Thyroid nodule 08/25/2020     Priority: Medium     Excessive bleeding in premenopausal period 08/25/2020     Priority: Medium     Added automatically from request for surgery [id number]       Bell's palsy 06/19/2020     Priority: Medium     See ER note 6/19/20.       Morbid obesity (H) 01/23/2019     Priority: Medium     HIEN (obstructive sleep apnea) 01/23/2019     Priority: Medium     Mild to moderate HIEN with mild sleep-associated hypoxemia   - Potential that severity under-reported given no supine REM observed    Polysomnography - Test date 6/11/2019    Weight 246 lbs    Sleep latency 38.9 minutes without Sleep Aid.  REM achieved with latency of 233 minutes.  Sleep efficiency 68%. Total sleep time 296 minutes.    Sleep architecture:  Stage 1, 12% (5%), stage 2, 60% (45-55%), stage 3, 13% (15-20%), stage REM, 15% (20-25%).  AHI was 6.9, with desaturations down to 80%. RDI 22.9.  REM AHI 39.1, consistent with severe REM HIEN.       CPAP titration:  Not performed         Gastroesophageal reflux disease without esophagitis 01/23/2019     Priority: Medium     Numbness and tingling 01/23/2019     Priority: Medium      Past Medical History:   Diagnosis Date     Abnormal maternal glucose tolerance, complicating pregnancy, childbirth, or the puerperium, unspecified as to episode of care       Hypertrophy of tonsils alone 12/15/2007     Past Surgical History:   Procedure Laterality Date     C  DELIVERY ONLY  3/98, ,      HC DILATION/CURETTAGE DIAG/THER NON OB  2002    Missed Ab     TONSILLECTOMY       Current Outpatient Medications   Medication Sig Dispense Refill     tranexamic acid (LYSTEDA) 650 MG tablet Take 2 tablets (1,300 mg) by mouth 2 times daily When having heavy vaginal bleeding 30 tablet 1     OTC products: None, except as noted above    Allergies   Allergen Reactions     Penicillins Rash      Latex Allergy: NO    Social History     Tobacco Use     Smoking status: Former Smoker     Types: Cigarettes     Smokeless tobacco: Never Used     Tobacco comment: smoked in [employer] lightly   Substance Use Topics     Alcohol use: No     History   Drug Use No       REVIEW OF SYSTEMS:   CONSTITUTIONAL: NEGATIVE for fever, chills, change in weight  RESP: NEGATIVE for significant cough or SOB  CV: NEGATIVE for chest pain, palpitations or peripheral edema    EXAM:   BP (!) 148/73 (BP Location: Left arm, Patient Position: Chair, Cuff Size: Adult Large)   Pulse 97   Temp 98.6  F (37  C) (Oral)   Resp 20   Ht 1.524 m (5')   Wt 112.3 kg (247 lb 9.6 oz)   LMP  (Within Weeks)   Breastfeeding No   BMI 48.36 kg/m    GENERAL APPEARANCE: healthy, alert and no distress  RESP: lungs clear to auscultation - no rales, rhonchi or wheezes  CV: regular rate and rhythm, normal S1 S2, no S3 or S4 and no murmur, click or rub   ABDOMEN: soft, nontender, no HSM or masses and bowel sounds normal  NEURO: Normal strength and tone, sensory exam grossly normal, mentation intact and speech normal    DIAGNOSTICS:   Hemoglobin (indicated for history of anemia or procedure with significant blood loss such as tonsillectomy, major intraperitoneal surgery, vascular surgery, major spine surgery, total joint replacement)    Recent Labs   Lab Test 20  1611 20  1017 19  1140   HGB 12.6 12.3  --    PLT  403 425  --    INR  --  1.11  --    NA  --  139  --    POTASSIUM  --  3.3*  --    CR  --  0.72  --    A1C  --   --  5.6        IMPRESSION:   Reason for surgery/procedure: menometrorrhagia    The proposed surgical procedure is considered LOW risk.    REVISED CARDIAC RISK INDEX  The patient has the following serious cardiovascular risks for perioperative complications such as (MI, PE, VFib and 3  AV Block):  No serious cardiac risks  INTERPRETATION: 0 risks: Class I (very low risk - 0.4% complication rate)    The patient has the following additional risks for perioperative complications:  Morbid obesity  Untreated borderline hypertension       ICD-10-CM    1. Excessive bleeding in premenopausal period  N92.4 tranexamic acid (LYSTEDA) 650 MG tablet     TSH with free T4 reflex     Prolactin     Surgical pathology exam     HCG Qual, Urine ([id number])     Pap imaged thin layer screen with HPV - recommended age 30 - 65 years (select HPV order below)     HPV High Risk Types DNA Cervical     Case Request: HYSTEROSCOPY, WITH DILATION AND CURETTAGE OF UTERUS, AND ENDOMETRIAL ABLATION     Case Request: HYSTEROSCOPY, WITH DILATION AND CURETTAGE OF UTERUS, AND ENDOMETRIAL ABLATION       RECOMMENDATIONS:         --Patient is on no chronic medications    APPROVAL GIVEN to proceed with proposed procedure, without further diagnostic evaluation       Signed Electronically by: Nila Sterling MD    Copy of this evaluation report is provided to requesting physician.    [facility] Preop Guidelines    Revised Cardiac Risk Index

## 2020-08-25 NOTE — LETTER
September 2, 2020    Nila Starks  [address] [place] [address]    Dear ,  This letter is regarding your recent Pap smear (cervical cancer screening) and Human Papillomavirus (HPV) test.  We are happy to inform you that your Pap smear result is normal. Cervical cancer is closely linked with certain types of HPV. Your results showed no evidence of high-risk HPV.  We recommend you have your next PAP smear and HPV test in 5 years.  You will still need to return to the clinic every year for an annual exam and other preventive tests.  If you have additional questions regarding this result, please call our registered nurse, Cynthia at [phone].  Sincerely,    Nila Sterling MD/neeru

## 2020-08-25 NOTE — TELEPHONE ENCOUNTER
"  You are now scheduled for surgery at [facility].  Below are the details for your surgery.  Please read the \"Preparing for Your Surgery\" instructions and let us know if you have any questions.    Type of surgery: HYSTEROSCOPY, WITH DILATION AND CURETTAGE OF UTERUS, AND ENDOMETRIAL ABLATION  Surgeon:  Nila Sterling MD  Location of surgery: [facility] OR    Date of surgery: 8/26/2020    Time: 11:00am  Arrival Time: 9:00am    Time can change, to be confirmed a couple of days prior by pre-op surgery nurse.    Pre-Op Appt Date: Done with Dr. Sterling 8/25/20  Post-Op Appt Date: To be determined by provider     Packet sent out: handed to patient in clinic  Pre-cert/Authorization completed:  TBD by Financial Securing Office.   MA Sterilization/Hysterectomy Acknowledgment Consent signed: No    [facility]  [phone]    Fax: [phone]  Same Day Surgery [phone]  Fax: [phone]    "

## 2020-08-26 ENCOUNTER — ANESTHESIA (OUTPATIENT)
Dept: SURGERY | Facility: CLINIC | Age: 45
End: 2020-08-26

## 2020-08-26 ENCOUNTER — ANESTHESIA EVENT (OUTPATIENT)
Dept: SURGERY | Facility: CLINIC | Age: 45
End: 2020-08-26

## 2020-08-27 LAB — COPATH REPORT: NORMAL

## 2020-08-27 NOTE — RESULT ENCOUNTER NOTE
Call to pt to notify of below.  Unable to reach.  Left message for pt to call back     Zoila Canela   [facility]  RN

## 2020-08-27 NOTE — RESULT ENCOUNTER NOTE
Pt notified of below.  Pt reports understanding.  Pt does not have further questions or concerns.  Patient will think about another biopsy and the ablation.  Patient reports the bleeding is improved.    Zoila Canela   [facility]  RN

## 2020-08-28 LAB
COPATH REPORT: NORMAL
PAP: NORMAL

## 2020-09-01 LAB
FINAL DIAGNOSIS: NORMAL
HPV HR 12 DNA CVX QL NAA+PROBE: NEGATIVE
HPV16 DNA SPEC QL NAA+PROBE: NEGATIVE
HPV18 DNA SPEC QL NAA+PROBE: NEGATIVE
SPECIMEN DESCRIPTION: NORMAL
SPECIMEN SOURCE CVX/VAG CYTO: NORMAL

## 2020-11-05 LAB
HPV COMMENT: NORMAL
HPV TYPE 16: NOT DETECTED
HPV TYPE 18: NOT DETECTED
HPVOH (OTHER TYPES): NOT DETECTED

## 2021-06-02 VITALS — WEIGHT: 242 LBS | HEIGHT: 61 IN | BODY MASS INDEX: 45.69 KG/M2

## 2021-06-17 NOTE — PATIENT INSTRUCTIONS - HE
"Patient Instructions by Tobias Wilder MD at 3/6/2019 10:30 AM     Author: Tobias Wilder MD Service: -- Author Type: Physician    Filed: 3/6/2019 11:44 AM Encounter Date: 3/6/2019 Status: Addendum    : Tobias Wilder MD (Physician)    Related Notes: Original Note by Tobias Wilder MD (Physician) filed at 3/6/2019 11:39 AM       Plan:  1. Welcome to your weight loss season, set a date to start and jump in.  You do qualify for surgical weight loss if you do not have a \"plan exclusion\".  Call your insurance to check, if you wish, view the Online seminar at [email or web address] and scroll down to the [employer] video and complete the quiz after.  If interested set up full hour intake visit.    2. For non surgical weight loss, start focus on 3 meals daily, getting a meal/20 grams of protein minimum within the first 90 minutes of waking, or using a protein drink/meal replacement. Meals should be about 5-6 hours apart and if longer, a protein snack used.  Hydrate well with 64-80 oz of water daily. Aiming for 75grams of protein daily to start until you see the dietician, I recommend a food journal to keep track of how your intake translates into how your feel/hunger control and weight.    3. For your appetite control, trial of bupropion/naltrexone:  Start bupropion one pill daily for up to 10 days and half a tablet of naltrexone once daily for up to 10 days and if doing well, increase both to twice daily dosing (see prescription).  Take with meals. On an empty stomach, naltrexone is nauseating. Discontinue if mood swings, severe nausea or you don't like it. Stop naltrexone if injury/accident or procedure that would require opiate pain medication (morphine, tramadol, vicodin, percocet, etc).     4. Check metabolic panel. Given your low HDL and high triglycerides, improved protein intake and avoiding sugary foods/white breads/pastas/rices will help and you could consider using 2 grams of fish oil daily " "(freeze capsules to reduce fish taste/burps).     5. D2 twice weekly for 4 months to improve levels into the normal range, then most people do well with using 2000-4000IUs daily of over the counter vitamin D3.    6. Schedule with dietician and recheck with me about 3-6 weeks after that to see how the medication is going.    7. Start walking 10 minutes 5 days a week.    8. Most importantly, follow up for improvement in sleep apnea. Tired people have trouble changing diet.       What makes a person succeed with dramatic and sustained weight loss?    It's being at the right point in your life where you feel the need to lose the weight, not because anyone told you so but because of a voice inside of you that says, \"I am ready for this\".  You're now at a point where you may be feeling anxious, irritable and when you look in the mirror you do not recognize the person looking back.  Your true self is buried somewhere in that reflexion and you want to free it again.  This is the sort of motivation that leads to success, and it comes from you.    Because the only person that can lose that extra weight is you, I like my patients to focus on the mindset of being in Weight Loss Season.  This gives you permission to make the changes necessary to be consistent with the diet/activity and behavior changes that lead to successful, healthy weight loss.  Nearly any diet plan can work for weight loss, but keeping it healthy and nutrient based to prevent deficiencies/hair loss/fatigue or irritability is vital.  If you have a plan you want to try, we'll work with you to make sure no adjustments are needed to keep you healthy through your weight loss season and working with our Bariatric Nutritionists you'll be given expert guidance to customize your diet plan to suit your particular needs. If you don't have your own ideas in mind, we are always happy to suggest well researched and validated plans that provide enough food to prevent " "hunger but still tap into your excess fat reserves and lose weight in a sustainable fashion.  There is great evidence that lean protein/healthy fat intake with good fiber intake while minimizing simple starches/carbs produces reliable/sustainable weight loss in most people. But some feel more connected to an intermittent fasting/fast mimicking or ketogenic diet.  These protocols can be hard for many to stick with and that's why we prefer the protein/healthy fat focused diets but if these alternative strategies appeal to you, we can work with you to optimize your knowledge and results with these tools.    Losing weight is a temporary commitment, but you need to be \"All In\" to have a good weight loss season.  To avoid frustration, you have to be willing to be nearly perfect 19 out of 20 days or even better than that. But, weight loss season is generally only 4-8 months in length. After that length of time, it can be hard to maintain a negative calorie balance and our brain, motivations and metabolism will usually bring you to a plateau that cannot be broken in this modern world where other commitments start to take priority. That's when we look to stabilize the weight loss you've achieved.  If you've reached your goal by that time, fantastic, and job well done.  If there is more to go, then after a few months of stabilization, we can usually attack that previous plateau and break into new territory.    Because of this time limitation, we want to really get to work right away and get into a sustainable routine ASAP.  One of the best predictors of how much weight you're going to lose throughout the season is how much you lose in the first 6 weeks, so prepare well and jump in with both feet.      Occasionally, people may feel like they cannot commit fully to the changes necessary and may want to change one thing at a time and \"get used to\" the idea of losing weight.  That is OK because that is where they are in their " "life, and they cannot fully commit for any number of reasons.  It's part of that internal motivation and they just haven't reached PRIORTY NUMBER ONE status yet. It's possible that what they need is more time to reach that point and I am always willing to work with people that want to \"dabble\", but understand, the amount of success obtained with half measures, is much less than half results. But Behavior Change cannot occur until a person goes through the contemplation and preparation phases necessary to have successful action and we are more than willing to give you targets to move along the spectrum and get to that point where you feel ready to  commit fully to the weight loss season.      As you go through your plan, look for things to keep your motivation rolling.  The most successful people have a goal or target/reward that they are working towards.  Having a reward that celebrates your new fitness, mobility and energy is the best sort because it will encourage you to do well with the weight maintenance phase and long term lifestyle changes that promotes keeping the weight off for the long term.  Usually, \"getting healthier\" or improving blood tests or losing weight so your clothes fit better is not as internally motivating as having a tangible reward.  A more motivating reward is one that isn't food based, is affordable, but something special:  Something you won't be getting unless you achieve your goal.   Its important to keep to the rule of success:  in order to get the reward, your goal MUST be achieved. Write this reward down, where you can look at it daily and keep it in the front of your mind as you go through your weight loss season and it will help keep you on track.    Tools that help change behavior are vital for success. The most studied and most supported tool for weight loss is nothing more than writing down your food and weight every day.  Every Day.  Accurately and completely.  When you commit " to weight loss season, this information tells you whether you're getting ENOUGH food to fuel your weight loss properly as well as teaches you the interaction between different foods you eat and how your body responds with weight loss.  You'll see that sometimes after a heavy workout you don't see the scale move until 2-3 days later.  How saltier meals (chili for example) may make you retain water for 4-5 days before you see the weight come off, you'll get used to the mini-plateaus that develop after a good 3-8 lb drop in weight as well as how you break through if you keep working the diet as you should.    Weight loss is not a linear process, there are mini ups and downs.  Learning how your body loses weight and getting comfortable with that is very rewarding. The act of writing words on paper also solidifies your will power and commitment to the season of weight loss and that by itself changes your brain chemistry/appetite, motivation and prepares you for maximal success.      Behavior change is all about getting into a new routine.  The old habits and routine have to change because without changing the circumstances of how you gained your weight, it's unlikely you'll enjoy satisfying results. If you have snacking habits, like every time you walk through the kitchen you grab a little something, well, that habit has to change and be replaced by a new habit.  It can be something as simple as keeping a doodle pad on the counter that you make a few scribbles and then walk through the kitchen having not opened the cupboard, or starting with a glass of water and leaving the kitchen without anything else, or checking your food journal to see how many calories you have left for the day.  Boredom is the enemy as are the old habits. Break new ground and try to push those old habits into a deep hole.      Finally, exercise always helps.  While not mandatory to lose weight, every little bit helps and exercise has so many other  "benefits that to not work it into your plan is to miss out on all the mood, sleep, stress and general health benefits that come from making yourself a little short of breath and sweaty at least 3-4 days out of the week.  The metabolism and calorie burn benefits aside, almost every chronic ailment in medicine gets better with proper, aerobic exercise.  Allow yourself to start slow and let your body prepare itself to accept harder training 4-6 weeks down the road, but start now and commit to a plan.  Whether you have the means to hire a , join a gym or just walk out your front door or go down to your basement for a video workout, get into a exercise routine and  after 3 weeks of at least 3 times a week exercise you should be at a point where you can slowly start ramping up 10% each week to our goal of at least 150-300minutes weekly of aerobic exercise and at least twice weekly resistance training/strenghtening with weights/bands or body weight exercises.     I am a big fan of modifying the free training plan, \"Couch to 5k\" for almost all of my patients. Just type it into [employer] or look it up on your smart phone bobby store.  To modify the plan,  you can use the training plan for whatever aerobic activity you do (bike/treadmill/elliptical/rower/pool/etc). During the \"jog\" intervals you just move a little faster or harder, or increase the tension or incline.  You use those little intervals to switch up the workout and recruit more muscles and pump the blood a little more and then recover again in the \"walk\" intervals by slowing down, decreasing the incline or turning down the tension.  3-4 days a week is not that much to ask and the benefits are enormous.  Start slow and develop the base from which you can then build on and reduce the risk of injury.  It's much more important 2-4 months from now to be enjoying your exercise then it is to over exert yourself at the start and hurting yourself.  Starting slowly allows " "your body to accept the training better down the road when the exercise becomes crucial for weight maintenance.  Without exercise down the road during your maintenance phase, all this hard work you are about to put in can be undone. It usually takes about 100-300 calories a day of exercise to maintain a weight loss and our focus during weight loss season is to generate the routine/activities and hobbies that make that enjoyable/sustainable.    Thanks for taking this first and most important step in your weight loss season.  Commit to it and we will cheerlead you all the way to success.  When things get tough or off track we'll offer guidance and analysis and when you reach your goal we'll celebrate your success.  In the end, it is all about your success and what you do with it.      Tobias Wilder MD  [facility]  [phone]        Weight Loss Shopping list:    Having the proper food on hand and ready to go is very important in losing weight.  Everybody has their own preferences/tastes so modify this list as you need but the following are foods that have been found to be helpful in following a calorie restricted diet.   If you are a person that doesn't \"like to eat my vegetables\", I recommend making smoothies and throwing the veggies into the  with some fruit and protein powder.      Fruits:  Generally limit to 2-3 whole fruits a day.  Do not buy Juice.  We depend on the fiber and chewing to slow us down and get phytonutrients/antioxidants available in the skins of fruits for health benefits.  Chewing also signals our stomach to send less hunger signals to our brains:    Apples (1-2 daily), Bananas (no more than one full banana a day), Grapes (2 handfuls a day), Clementines/oranges (one daily), berries (blue/rasp/straw:  2 handfuls a day). Watermelon (cubed for easy access, small bowl).    Vegetables:  Eating raw gets most nutrient and fiber benefits but cooked veggies are " "fine as well, using frozen for cooking or in smoothies is fine as well.  The more chewing/crunchiness the better the hunger control.  No limit to how many a day, but you should at least get 4 handfuls a day or more minimum.  Wash and cut up your vegetables into easy to carry, on the go sizes that are easy to put in plastic bags/pyrex and carry to work/school/etc.  Frozen veggies are just fine as well.     Broccoli, Carrots (no more than 3 a day large carrots or 2 handfuls of baby carrots, as they are very sweet), celery, cucumbers, green peppers, green beans (canned or fresh/frozen), Lettuce(all types), Beets(for roasting, will likely turn urine and stool a bit purple), asparagus.  Go crazy with the veggies, just wash well prior to eating.    Protein:  Women need at least  grams of protein a day and men at least  grams a day, more is fine.  Protein is our \"brain food\" and hunger suppressor and getting enough ensures maintenance of muscle mass during weight loss.  Vegetarians should look to balance their protein intake to get all essential amino acids and discuss with dietician if help is needed (mix of beans/soy/etc).  Protein powders or drinks count and can be a great way to control appetite during the day and easy to grab and go/carry to work/etc.  Premier Protein, BiPro, TarasWhey are all brands with high quality whey protein. For whey sensitive people, rice protein is an option as well.    Canned tuna/sardines or anchovies.  Fresh fish/salmon the day you plan to make it.  Chicken breasts/thighs (skinless while losing weight), filet mignon steak (leaner but ) or marinade sirloin (wipe off before cooking). Eggs cooked in olive oil for breakfast is a good way to get protein and good fat in the a.m. (cook on low heat to prevent transformation of olive oil into less healthy fat).  Greek Yogurt with at least 20 grams of protein per serving and less than 11 grams of carbs/sugars.  String " Cheese  Cottage Cheese: 2% or fat free per your preference.              On-the-Go Breakfast Ideas  As of 2015, the latest research shows what a huge impact eating breakfast has on losing weight and feeling your best. People lose more weight when they make breakfast their biggest meal of the day compared to Dinner, but even if you cannot go to that degree, getting a breakfast that has at least 20 grams of protein and even a moderate amount of fat is ideal for maintaining good energy through the day and limits overeating in the evening hours.  The following are some quick and easy suggestions for at least getting something of substance into your body in the morning.  Enjoy!    Eating breakfast within 90 minutes of waking up is an important part of taking care of your body.  After sleeping for hours, your body is in need of fuel.  An ideal breakfast is a combination of protein, whole-grain carbohydrates, or fruit.  Heres why:    -Protein digests very slowly in the body, helping you feel more satisfied.  -Whole grains provide dietary fiber, which also digests slowly and helps keep your gut clean.  -Fruit is a great source of vitamins, minerals, and fiber.      Each one of these breakfast combinations has between 200-300 calories and 15-20 grams of protein.  Feel free to mix and match!    Protein: Choose  -1/2 cup low-fat cottage cheese  -2 hard boiled eggs , or one cooked in olive oil (low/slow heat).  -1 low fat string cheese stick  -1 Tablespoon natural peanut butter  -[employer] vegetarian sausage nela (found in freezer section)  -1 slice lowfat cheese  -6 oz 2% or lowfat Greek yogurt, such as Fage or Oikos.    PLUS    Whole Grains:  Choose?  -1 whole wheat English muffin  -1 whole wheat rhys, half  -1/2 Fiber One frozen muffin, thawed  -1/2 Fiber One toaster pastry  -1 whole wheat bagel thin  -1/2 cup Kashi cereal  -1 Kashi waffle (or other whole grain high-fiber waffle)    OR    Fruit: Choose  -1/3 cup  blueberries  -1/2 banana (or a plantain- similar to a banana, yet smaller)  -1/2 cup cantaloupe cubes  -1 small apple  -1 small orange  -1/2 cup strawberries    *Adapted from Diabetes [employer], Fall 20    Ten Breakfasts Under 250 calories    Ideally, getting between 350-600 calories  (depending on starting height and weight)for breakfast is ideal for avoiding hunger later in the day, adjust/add to the following accordingly:    One- 250 calories, 8.5 g protein  1 slice whole-grain toast   1 Tbsp peanut butter    banana    Two- 250 calories, 8 g protein    cup nonfat/lowfat yogurt  1/3rd cup diced no-sugar peaches  1/3rd cup cereal (like Special K, Cheerios, or bran flakes)    Three- 250 calories, 25 g protein  1 egg scrambled with 1 oz skim milk    cup shredded cheddar    whole grain English muffin  1 oz Tristanian pedraza  1 tsp margarine spread    Four- 225 calories, 25 g protein  1/2 cup Kashi Go-Lean cereal    cup skim milk mixed with 1 scoop Bariatric Advantage protein powder    cup no-sugar diced pears    Five- 250 calories, 20 g protein    cup oatmeal prepared with skim milk, 1 scoop protein powder, and sugar-free maple syrup    Six- 200 calories, 5 g protein  1 whole grain waffle, toasted  1 tablespoon creamy peanut or almond butter    Seven-  250 calories, 19 g protein  Breakfast sandwich: 1 slice whole grain toast, cut in half.  Add 1 scrambled egg and one slice cheddar  cheese.    Eight-  250 calories, 15 g protein  2 eggs scrambled with 1/3 cup frozen spinach (heat before adding to eggs) and 2 tablespoons low fat cream cheese.    Nine-  150 calories, 15 g protein  2/3rd cup cottage cheese    cup cantaloupe    Ten- 200 calories, 20 g protein  Fruit smoothie made with 4 oz. nonfat Greek yogurt,   cup berries, 1 scoop protein powder, and 4 oz skim milk.    Ten Lunches Under 250 Calories    Aim for lunch to be around 300-400 calories a day when trying to lose weight and get that protein in!    One- 200 calories, 11  g protein  1/3 cup tuna salad made with light hutchinson on 1 slice whole grain bread  1 small peeled apple    Two- 250 calories, 16 g protein  1/3 cup lowfat cottage cheese    cup cooked green beans    small fruit cocktail (in natural juice)    Three- 200 calories, 11 g protein    grilled cheese sandwich on whole grain bread with lowfat cheese  2/3rd cup of tomato soup    Four- 250 calories, 22 g protein  Deli wrap: 1 oz sliced turkey, 1 oz sliced ham, 1 oz sliced chicken rolled up with 1 slice low-fat cheese  1 small orange    Five- 250 calories, 28 g protein  2/3rd cup chili with 1 oz shredded cheese  4 saltine crackers    Six- 250 calories, 22 g protein  1 cup fresh spinach with 2 oz chicken, 1/3rd cup mandarin oranges, and 2 tablespoons sliced almonds with 1 tablespoon light vinaigrette dressing    Seven- 200 calories, 11 g protein  1 Tbsp sugar-free preserves and 1 Tbsp peanut butter on 1 slice whole grain toast    cup nonfat/lowfat Greek yogurt    Eight- 250 calories, 18 g protein  1 small soft-shell chicken taco with 1 oz shredded cheese, lettuce, tomato, salsa, and 1 Tbsp light sour cream    cup black beans    Nine- 225 calories, 13 g protein  2 ounces baked chicken  1/4 cup mashed potatoes    cup green beans    Ten- 200 calories, 21 g protein  Deli ryhs: 2 oz roast beef or other deli meat with 1 tsp Nitish mayonnaise and sliced tomato, onion, and lettuce  1/3rd cup cottage cheese      Ten Dinners Under 300 calories    If you're eating a large breakfast and medium lunch, keep dinner small.  300-400 calories is ideal for most people depending on their caloric needs.    One- 300 calories, 12 g protein  1-inch thick slice of turkey meatloaf    cup baked butternut squash    Two- 200 calories, 9 g protein  Bread-less BLT: 3 slices turkey pedraza, sliced tomato, wrapped in a large lettuce leaf    cup peeled fruit    Three- 275 calories, 36 g protein  3 oz roasted chicken    cup cooked broccoli    cup shredded cheddar  cheese    cup unsweetened applesauce    Four- 200 calories, 25 g protein  3 oz baked tilapia  1/3rd cup cooked carrots    cup yogurt    Five- 250 calories, 20 g protein  Grilled ham n Swiss: spread 2 tsp light margarine on 1 slice of whole grain bread.  Cut bread in half, layer 2 oz deli ham with 1 piece of Swiss cheese and grill until cheese is melted.    cup cooked vegetables    Six- 250 calories, 18 g protein  Vegetarian cheeseburger: 1 [place] cheeseburger topped with lettuce, onion, tomato, and ketchup/mustard    cup sweet potato fries    Seven- 250 calories, 18 g protein  Pork pot roast: 2 oz roasted pork loin, 1/3rd cup roasted carrots,   medium potato, cooked with   cup gravy    Eight- 300 calories, 25 g protein  2 oz meatballs (about 2 small meatballs)    cup spaghetti sauce  1/2 piece toast topped with 1 tsp light margarine and topped with garlic powder, toasted in oven    Nine- 250 calories, 16 g protein  Mexican pizza: one 8 corn tortilla topped with 2 oz chicken,   cup salsa, 2 tablespoons black beans, 2 tablespoons shredded cheese.  Bake until cheese is melted.    Ten- 250 calories, 22 g protein  Shrimp stir-keith: 3 oz cooked shrimp, 1/6th onion,   pepper,   cup chopped carrots sautéed in 1 tablespoon olive oil, topped with 2 tablespoons stir keith sauce and a pinch of sesame seeds        Bupropion/Naltrexone Patient Information (trade name, CONTRAVE)    This medication is used for weight loss.  In studies people lost an averaged 5-8% of their starting weight compared to placebo (0-1%).    Often, this medication will be written as 2 separate prescriptions to improve cost to the patient. The trade name drug CONTRAVE comes as a combination of 8mg naltrexone/90mg bupropion and a 3 week escalating dose regimen going from one tablet daily up to a max of 2 tabs twice daily:  Week 1: one tablet in the daytime.  Week 2: one tablet A.M.  And One tablet in the PM.  Week 3: 2 tabs AM  And One tablet in the PM.  Week 4:  2 tabs AM and 2 tabs PM.     The generic Rx I write for is as follows:  I recommend starting One 75 mg bupropion and 1/2 a tablet of naltrexone (25mg) daily for 10 days and then moving up to One 75 mg bupropion tablet twice daily and half a naltrexone tablet twice daily.  Depending on your results/tolerance, we may increase the Bupropion up to a maximum of 150 mg twice daily of the immediate release medication or one Bupropion  mg-300 mg daily. Sometimes we'll have to go slower with the dose escalation.    Like any weight loss medication, showing results and having tolerable or no side effects is vital to continuing therapy and if either no significant weight loss after 8-12 weeks or too many side effects then we would discontinue therapy and look for alternative options/assistance.    AVOID taking with high fat meals as this increases bupropion levels, but some food in the stomach can help decrease nausea side effects from the Naltrexone.    People who take Metoprolol may need to decrease their dose of metoprolol as the bupropion increases the effect of metoprolol 2-4 fold in some cases and low blood pressure/low heart rate could occur.    Patients should STOP CONTRAVE if they have a severe allergic reaction to CONTRAVE.  Patients should be told that CONTRAVE should be discontinued and not restarted if they experience a seizure while on treatment.    Patients should be advised that the excessive use or abrupt discontinuation of alcohol,benzodiazepines, antiepileptic drugs, or sedatives/hypnotics can increase the risk of seizure.    Patients should be advised to minimize or avoid use of alcohol.    Patients should be advised that if they previously used opioids, they may be more sensitive to lower doses of opioids and at risk of accidental overdose should they use opioids after CONTRAVE treatment is discontinued or temporarily interrupted.  Patients should be advised that because naltrexone, a component of  CONTRAVE, can block the effects of opioids, they will not perceive any effect if they attempt to self-administer anyopioid drug in small doses while on CONTRAVE. Further advise patients that the attempt to administer large doses of any opioid or to bypass the blockade while on CONTRAVE may lead to serious injury, coma, or death.    Patients should be off all opioids for a minimum of 7 to 10 days before starting CONTRAVE in order to avoid precipitation of withdrawal. Advise patients they should not take CONTRAVE if they have any symptoms of opioid withdrawal.   Patients should be advised to call their healthcare provider if they experience increased blood pressure or heart rate.  Patients should be advised to notify their healthcare provider if they are taking, or plan to take, any prescription or over-the-counter drugs. Concern is warranted because CONTRAVE and other drugs may affect each others metabolism.  Patients should be advised to notify their healthcare provider if they become pregnant, intend to become pregnant, or are breastfeeding during therapy.  CONTRAVE should NOT be taken if pregnant or nursing.    Patients with type 2 diabetes mellitus on antidiabetic therapy should be advised to monitor their blood glucose levels and report symptoms of hypoglycemia to their healthcare provider(s).    Patients should be advised to swallow CONTRAVE tablets whole so that the release rate is not altered. Do not chew, divide, or crush tablets if using the Trade drug Contrave, dividing the generic naltrexone is fine.    As always, if any questions/concerns, don't hesitate to call us at the [facility] clinic.    Tobias Wilder MD  [phone].  3/6/2019            Exercise Guidance    Nearly everything that bothers us gets better when the proper amount of exercise can be done in the proper amounts.  Getting to that level safely and without injury is the key.  When it comes to weight loss,  exercise is especially important in maintaining the weight loss.  Unfortunately, one of the harsh realities is that substantial weight loss slows our metabolism, often anywhere from 5-20%.    Our brain always remembers our heaviest weight and we can return to that if we're not mindful and moving regularly.  Our biology doesn't understand the concept of having too much energy, only not having enough.  As such, when we lose weight, it's thought that the brain interprets this as we're ill or in a famine and dials back our metabolism to limit further weight loss.  This is why exercise is so important in keeping the weight off and is the main reason people have some weight regain from their low weight point after weight loss.  We have to make up that 10-20% of calories not being burned.Since we can restrict our intake for only so long, exercise becomes very important in our long term healthy weigh maintenance to balance out the occasional indiscretion.    Generally, for every 5% body weight reduction in a weight loss season, a person needs to add  kilocalories of exercise in their daily routine to keep that weight off for the long term.  This is why it's vital to be starting your fitness regimen during weight loss season, it becomes so vital for long term success in maintaining that weight loss.    Additionally, all sorts of good enzymes and genes turn on with exercise and our stress, sleep, mood and bodies feel better when we can get to the point of making ourselves a little sweaty and short of breath 35-50 minutes most days of the week.    Who isn't ready for exercise? Well, if you get severe dizziness/palpitations, chest pain or short of breath/faint with even minimal activity like walking across a room or you're having to pause while going up a flight of stairs, then getting your heart and/or lungs fully evaluated prior to starting an exercise regimen is recommended. Everyone else can probably start a program,  but everyone may start at a different point:  Some can set a 5-10 minute walking goal and others will be able to ride their bike for an hour.      Start with where you're at and look to add 10% more each week until you're at that 150 minutes or more a week (or 75 minutes/week or more of vigorous exercise). Moderate exercise can be estimated as the pace you can carry on a conversation and vigorous is the pace at which you can get 3-5 words out before having to take a breath.  If you're using heart rate monitoring, Moderate is about 60% of your maximum heart rate and vigorous about 75%. (Max heart rate estimated as 220 beats minus your age:  Example: 220-age of 42 =176 Beats per minute (BPM) maximum. 0.6X 176= 105 BPM (moderate), 132 BMP(vigorous)).    If you like to count steps, the 10,000 steps per day does correlate well with weight maintenance but try to make at least 20-25% of those steps at a brisk pace (like you are about to miss your bus).    Let's move!  Tobias Wilder MD.               Basic Smoothie:    Start with a good, unsweetened protein powder (BiPro, TerasWhey, or your favorite) 2 scoops is usually at least 20 grams of protein. (goal of 20-30 grams), READ THE LABEL.    Drizzle (1 tablespoon) of olive oil (120 andrea) and/or 1/2 an avocado (remove pit:175 andrea) for good fat/satiation.  Adjust these to fit into your particular calorie needs.    Half a banana (70 andrea )    Handful of frozen blueberries/raspberries/peach slices (or all) (30-40 andrea)    Small,  thumbnail sized piece of angelita root (cut off skin) (10-20 andrea)    3 leafs or more of Kale (strip away from stem and use just the leaves).  May use fresh or frozen.  Can also add spinach/parsley to mix it up.  (25 andrea)    One stalk of celery torn in 2-3 pieces. Add more if desired. (5 calories)    1/3-1/2 of a cucumber cut into small sections. (15calories)    1 inch section of Red/Green/Yellow Bell pepper without the seeds. (10 calories)    Add 6 oz of  water to allow for a drinkable consistency. More if needed.    Blend until smooth.  Drink right away or bring to work (keeping refrigerated) for an on the go lunch.    Rinse  right away to avoid stuck on mess.      Play around with different combinations or your personal favorites.  Try to get a bit of good fat, protein powder, fruits and veggies all in one smoothie.  Watch portion sizes   The above recipe is roughly 450-500 calories depending on how much actually goes into smoothie. This would be a good meal replacement.  If trying to lose weight with a smoothie type diet, I recommend a complete multi-vitamin to make sure all nutrients are supported. Having 2 smoothies a day and one balance evening meal such as fish/stir fried vegetables/mushrooms and a glass of water will generally keep daily calorie content between 3356-6020 calories a day.  [place] women or those with a less than 1300 calorie daily target may need to pay closer attention to portion sizes to achieve their target daily caloric intake for sustainable/durable weight loss.          To help lose weight in a safe way and sustainable way, I'd like to start you on a 1300 calorie diet each day.  Understanding that every 3500 calorie deficit adds up to a pound of weight reduction, with the combination of light aerobic exercise, some modest weight training and diet, we should be able to lose around 1 to 2.5lbs weekly depending on your starting height and weight.    Hunger and fatigue are the enemies of weight loss and behavior change in general.  We become much more habit and reactive in our eating when we're hungry and/or tired and frequently have less self control.  It's not a personal failing, it's just body chemistry.   We can combat this by trying to avoid being tired and hungry at the same time.  To help this,  try to front load your calories in the first 10 hours of you day so you get into the fatigued evening hours reasonably full and you can  control impulses/mindless eating a lot better and avoid those bedtime snacks/evening treats that the tired brain craves.    Weight loss goes through ups and downs and plateaus but if you stay on the program, you will enjoy success.   Commit to the process, try to be good at least 19 days out of 20 and continue to think about why you're doing this and what you're working towards. If you haven't thought of your reward for hitting your weight loss goals, think about it now. Using these little victory bribes along the way helps a lot.    Finding a diet that is satisfying and repeatable-- day in and day out, improves success.  An outline of how to break up the day's food is given below.  It is a starting point and example of what a 1300 calorie diet looks like and you can modify the listed foods to suite your particular tastes, but pay attention to portions.   Do not skip breakfast as it will leave you hungry and lead to overeating at some point during the rest of the day.  If you can make Supper the last food for the day more days of the week then not it can help.  That means that those first 10-12 hours of the day and hitting your protein/intake targets for all three meals is vital to your success and evening hunger.    Start reading labels so you know that you're getting what you think you are and start measuring foods so you can eventually look at a portion and understand how it will provide the fuel you want.    Prepping raw veggies after you buy them (washing and putting into bags/tupperware for easy access), cooking several chicken breasts for the next 2-3 lunches and generally being able to grab and go what will keep you on target when time is short will greatly aid your success.  Prepare and plan and success will follow.    Read labels:  for protein portions/yogurt, protein bars etc looking for items with more than 10 grams of protein and less than 10 grams of sugar is very helpful.  Frozen meals should have at  least 18 grams of protein, under 10 grams of sugar as well (typically around 300 calories).    Please note: if you've had previous bariatric surgery: wait 20-30 minutes before/after eating to drink your beverages to avoid early fullness/dumping syndrome/worsening malabsorption, early loss of fullness and hunger.  Start with eating your protein first, slow down your meals and chew thoroughly.          1300 calorie diet:  Think Big Breakfast, Medium Lunch, smaller dinner.    Breakfast goal of 300 calories-350 calories (egg, 1/3 to 1/2 cup cooked old fashioned oatmeal or steel cut oats and berries,3-4oz greek yogurt.)Glass of water and if you like coffee (black) or tea.        Mid morning Snack or part of lunch, about 2-2.5 hrs later: 100 calories (cottage cheese/string cheese/ fruit or banana) and a handful of cruchy/green veggies (cucumber/celery/green peppers/broccoli).  Small glass of water    Lunch:  300 calories (tuna with little hutchinson (no bread), apple, salad with drizzle of olive oil/balsamic vinegar for example)  Water    Snack:  100 calories.  4-5 oz Greek Yogurt with at least 17 grams of protein per serving.    Or Cottage cheese (lower sodium version preferable). Get this in about 2 hrs before you plan to have dinner. Protein drinks with at least 15-20 grams of protein and less than 6 grams of sugar could be used here to hit protein goals and decrease afternoon/evening hunger.  Glass of water    Dinner:  350 calories (4 oz meat or fish with cooked veggies or salad with minimal dressing, one piece of bread).  Glass of water or unsweetened tea with lemon    Snack: 100 calories Medium Apple or Small handful of nuts, about 2/3 to 3/4 an ounce (almonds/walnuts/cashews or pistachios are ideal).   Glass of water.    Try to avoid all soda and juice (low sodium V8 ok once a day).   You can do it!    Choose an activity that is fun/interesting and available to you such as  Going for a swim for 15 minutes, walking 40  minutes, elliptical 20 minutes or cycling 20 minutes as many days a week as you can.  If those times are too long for your fitness level, start at 10 minutes of movement and each week try to increase by 2 minutes each week.  The first 70 minutes a week (10 minutes a day only) of exercise drops the mortality rate of a sedentary person 30%!!!!  Our goal is to work up to 150 minutes or more per week of moderate to vigorous exercise  to optimize metabolism and prevent weight regain during maintenance.     10 minutes of weight lifting can be helpful as well or using some body weight exercises like wall squats, wall pushups, seat presses, yoga moves. At least twice weekly helps maintain a good strength to weight ratio, more days is better.    If you are into strenuous weight lifting or prolonged exercise, use an online calculator for how many calories you've burned and if your exercise is lasting over 60 minutes, replace 25-30% of those calories burned immediately after exercise .  For the more limited exercise (less than 500 calories burned), there is no need to add extra food unless you notice a lot of hunger on your food journal.  Usually  Even a  calorie load after longer workouts is more than adequate.  For longer efforts, hunger will increase if you don't refuel afterwards and can get meal plan off track due to hunger, so replenish immediately after the workout to keep on the diet plan and feeling good.    Exercise example:  If you burned 1000 calories during the exercise, immediately (within 30 minutes) have a snack/replacement beverage totaling 250-300 calories and ideally have a 3:1 ratio of carbohydrate grams to protein grams to keep muscles ready for exercise the next day.  Have your next, full meal within 2-3 hours of exercise.    Tips for success:  KEEP A FOOD JOURNAL and a log of daily weights.  1.  Prepare proteins ahead of time (broil chicken breasts in bulk so you can grab and go), steel cut oats  "can be stored in casserole dish/bowl in the fridge for quick scoop in the morning and rewarm in microwave, make use of crock pot recipes (watch salt content).    2.  Drink a 8-12 oz glass of water every 2-3 hours when awake.  We often mistake hunger for thirst, especially when losing weight.    3. Remember your Reward and Motivation when things get hard.    4.  Weigh yourself every morning and record, you'll stay on track better and learn how your body loses weight. Don't worry about 1 or 2 day patterns, but when on track you'll notice good trend downward of weight over 3-4 day segments.    5. Call or use [employer] messaging if problems/concerns .    6.  Find a handful of meals/foods that keep you on track and get into a boring routine that is sustainable for you.    7.  Take a complete multivitamin just to make sure all micronutrients are adequate during weight loss.    8. If losing hair/brittle nails it usually means you are not taking enough protein.  Minimum goal is 60 grams daily of protein, most people with normal kidneys do well with upwards of 100-120 grams/day of protein. Consider taking Biotin as supplement or a \"Hair and Nail\" multivitamin.  If you are hypothyroid and losing hair, see you doctor for a check up of your levels if you haven't had one recently.    9.  Getting adequate sleep is very important for starting your day properly, when we are sleep deprived, our morning appetite is suppressed and without eating an adequate breakfast, we overeat later in the day when we're tired.  Aim for 7 hours of sleep nightly if possible.  If you sleep is disturbed, perform some introspection on stressors/depression/anxiety/PTSD events or possible sleep disorders and we can trouble shoot solutions/evaulations if issues persist.    Exercise during the day, meditative breathing before bed and after waking and removing the television from the bedroom are easy ways to improve quality of sleep.  Most people can tolerate " 1-5 mg of melatonin about an 60-90 minutes before they plan to go to bed if these measures aren't helping.    10. Relaxation.  Controlled breathing exercises can lower stress levels in the brain.  One technique is 4, 7, 8 breathing:  Place the tip of your tongue behind your front teeth, breath in through your mouth for 4 seconds, Hold it for 7 seconds and breath out slowly, making a leaky tire noise for 8 seconds.  Repeat 4 times.  Ideally do at the start and end of your day or if feeling stressed.  It works and it's why meditation/yoga/martial arts are often very breath based activities.  There are breathing techniques for alertness as well as relaxation out there and they can be quite helpful.

## 2021-06-18 NOTE — LETTER
Letter by Tobias Wilder MD at      Author: Tobias Wilder MD Service: -- Author Type: --    Filed:  Encounter Date: 3/6/2019 Status: (Other)       Gina EMILEE Gonzalez, CNP  [address] [facility] [address]                                  March 6, 2019    Patient: Nila Starks   MR Number: [id number]   YOB: 1975   Date of Visit: 3/6/2019     Dear Dr. Gonzalez:    Thank you for referring Nila Starks to me for evaluation. Below are the relevant portions of my assessment and plan of care.    If you have questions, please do not hesitate to call me. I look forward to following Nila along with you.    Sincerely,        Tobias Wilder MD          CC  No Recipients  Tobias Wilder MD  3/6/2019 12:17 PM  Sign at close encounter  [facility]:  Non-Surgical Weight Loss Intake Appointment   Date of visit: 3/6/2019  Physician: Tobias Wilder MD  Primary Care is Provider, No Primary Care.  Nila Satrks   43 y.o.  female  Nila is a 43 y.o. year old female who is here today for consultation regarding non-surgical weight loss.   she was referred to the [facility] by her PCP, Dr. Glez.    Weight History:   Wt Readings from Last 3 Encounters:   03/06/19 (!) 242 lb (109.8 kg)     Body mass index is 45.73 kg/m .       Assessment and Plan   Assessment: Nila Starks is a 43 y.o.,female presenting for assistance with medical weight loss.  Identified issues contributing to her excess weight include:     Currently has poorly treated sleep apnea that she's following up in the next couple weeks. We discussed how her fragmented/insufficient sleep can impact morning appetite and affect energy for meal preparation and avoiding convenience eating.       She has a heavy reliance on using [employer] and fast food chains due to proximity to her work/lack of options and the above noted lack of energy towards meal preparation.    Her life is quite sedentary and at no  keara was interested in athletic pursuits and she has zero scheduled/intentional exercise.  We discussed that even adding 10 minutes daily can cut her mortality risks 30% and she's contemplative to starting a walking regimen in her down time while her daughter is at dance class in the evening or prior to her later start on Mon/Wed.     She's contemplative about surgical weight loss but doesn't know her insurance benefit for bariatric surgery.  Given her HIEN, elevated BP and BMI of 45 she would be criteria if her plan does not have an exclusion.  She was given information on our online seminar.     She's open to use of appetite suppressants and given mildly elevated BP of late/anxious affect will favor trial of bupropion/naltrexone rather than phentermine.     Strong family hx of type II diabetes and A1c of 5.6% with some start of acanthosis to posterior neck crease. Diet/exercise change and weight loss should reduce her risk of developing type II diabetes in the next decade.      Plan:  1.  Behavior Goals: 3 daily meals plan, ideally with a large breakfast, medium lunch        and smaller dinner. Avoidance of sugared-sweetened beverages and processed        carbohydrate snacks.  Work towards pre-planning meals to avoid falling back into old             habits/obesogenic habits.  Daily Food Tracking and morning weight recommended.  [facility]       [facility] to [facility].    2.  Diet Goals: Recommend a protein goal of 75g/day to start until she meets w/ dietician.    Increased protein intake to insure at least 20-25 grams of protein per meal.      3.  Exercise/Activity Goals: start walking 10 minutes most days of the week minimum.  Long term goal of increasing endurance to allow for at least 150 to 200 minutes weekly of moderate exercise to maintain weight loss.      4.  Recommendation regarding weight loss medication:  Bupropion/naltrexone trial started.    5.  Referral to Dietician for further  "education and customization of diet plan.    6.  Stress Reduction: walking encouraged.    7.  Intake Labs:  Reviewed care everywhere that showed very low vitamin D, normal TSH, B12 levels and A1c of 5.6%.    8.   Low vitamin D. To boost levels into thenormal range twice weekly D2 rx'd for 3 months and then would sustain levels w/ 2000-4000IUs daily of D3.     9. Sleep apnea hx: follow up as planned w/ sleep medicine as good behavior change/weight loss is unlikely without quality/restorative sleep.    There are no diagnoses linked to this encounter.     No Follow-up on file.     Weight and Lifestyle History    Sleep history:  Goes to bed around midnight. Up and down frequently to use bathroom, 3-4 times nightly no snacks. Wakes for the day on Mon/Wed will sleep in, otherwise up at 6:45-7am. Once awake, gets dress (bathes at night), no time to eat so skipped heads to work, commute is 15 minutes. 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 May have a Catrachita Bar. Subway for lunch: turkey sub 6 inch, w/ water occ cookies. Will get off anywhere from 4:15 to 5pm, heads to  her 13 yo daughter from [employer]. May be at Dance until 8-9pm , may go out for supper once weekly (Tuesdays).  On Mondays, works 12:30pm to 9pm.  Will have fast food on the way home from dance .   On Mondays, no morning food, maybe water in the AM and Catrachita Bar. Break is at 3pm goes to subway (convenient). Fast food or cereal at home. Other days, just sits and watches dance until done at 8-9pm. After dance, will do chores. No real hobbies. Used to do more crafting when kids were younger.   Formerly was doing YouTube videos (\"fitness efren\" dance videos). [place]   Hours per night: 6-7, interrupted  Snoring/apnea/insomnia? yes  Restful/refreshed? no  Shift work? yes  CPAP/BiPAP? no  Sleep study previously? yes  TV in bedroom? no  Sleep aids/pills? no      STOP-BANG score for sleep apnea : na  For general population "   HIEN - Low Risk : Yes to 0 - 2 questions  HIEN - Intermediate Risk : Yes to 3 - 4 questions  HIEN - High Risk : Yes to 5 - 8 questions  or Yes to 2 or more of 4 STOP questions + male gender  or Yes to 2 or more of 4 STOP questions + BMI > 35kg/m2  or Yes to 2 or more of 4 STOP questions + neck circumference 17 inches / 43cm in male or 16 inches / 41cm in female    Weight History:  In what way is your excess weight affecting your wellness/health? Aches/pains  Heaviest weight: near current levels  Any Previous weight loss, what was the most lost and method: na  Birth weIight, High School graduation weight: na  Lowest adult weight: na  Maternal health/smoking/weight: na  When did you start gaining weight and what were the circumstances? na  Is anyone else in your immediate family overweight? yes  Finally,  Is there a weight you desire to be, what is your goal weight that would define successful weight loss for you? na  I would like to lose weight so I can?:  Feel better.    .  Feels she's not desireable to her     Barriers to weight loss:  Is anyone else at home/work/family a barrier to your weight loss? no  Work schedule/location? See above  Current stressors include: family/work balance  Mobility problems: no    Counseled on health benefits of weight loss, goals for metabolic/diabetic risk reduction, HTN reduction, improved sleep and mobility, cancer reduction, longevity.    Fitness History:  Were you ever an athlete?no     Which sports? no  When was the last time you walked/hiked a mile?na  Do you have any limitations for activity?no  Do you have access to a gym, pool, club, fitness center, or ?no                Do you have any fitness goals/dreams that could motivate your weight loss?no    On a scale from 0-10,  how willing are you to start some sort of movement/exercise regimen? contemplative    Counseled on physiologic benefits of exercise and for long term weight maintenance, goal working towards  "200-300 minutes weekly.     Food History:  Who buys groceries?  She does  Do you eat at dinner table, is the TV on or off, family present? On the go eating almost always  Any soda, how much? na  Meals per day? 2  Snacks per day? occ Karlos bar  Breakfast typically is: skipped or Karlos bar  Lunch typically is: subway most days  Dinner  is: fast food often  Weekly Fast Food/dining out: 3-6x weekly  Snacks consist of: karlos bar    Food sensitivities/allergies/intolerances/avoidances: na  Water per day? unsure    Any binging behavior?no  Any induced vomiting/purging? no  Any history of anorexia/bulimia? no  Any night eating issues? no  Stress induced eating? no    Goal Setting:  Short Term Goals 10% weight reduction is 24 lbs, under 218 lbs  Long Term Goals BMI under 30, improved A1c, triglycerides.         Patient Profile   Social History     Social History Narrative   ? Not on file     Family History   Problem Relation Age of Onset   ? Diabetes Mother    ? Hyperlipidemia Mother    ? Hypertension Father    ? Diabetes Father    ? Prostate cancer Father    ? Diabetes Sister           Past Medical History   There is no problem list on file for this patient.    Penicillins  Current Outpatient Medications   Medication Sig   ? cholecalciferol, vitamin D3, (VITAMIN D3) 1,000 unit capsule Take 1,000 Units by mouth daily.   ? vitamin E 200 UNIT capsule Take 100 Units by mouth.       Past Surgical History  She has a past surgical history that includes  section, classic.     Examination   /84 (Patient Site: Right Arm, Patient Position: Sitting, Cuff Size: Adult Large)   Pulse 70   Ht 5' 1\" (1.549 m)   Wt (!) 242 lb (109.8 kg)   SpO2 98%   Breastfeeding? No   BMI 45.73 kg/m     Height: 5' 1\" (1.549 m) (3/6/2019 10:10 AM)  Initial Weight: 242 lbs (3/6/2019 10:10 AM)  Weight: (!) 242 lb (109.8 kg) (3/6/2019 10:10 AM)  Weight loss from initial: 0 (3/6/2019 10:10 AM)  % Weight loss: 0 % (3/6/2019 10:10 AM)  BMI " "(Calculated): 45.7 (3/6/2019 10:10 AM)  SpO2: 98 % (3/6/2019 10:10 AM)  Waist Circumference (In): 48 Inches (3/6/2019 10:10 AM)  Hip Circumference (In): 52.25 Inches (3/6/2019 10:10 AM)  Neck Circumference (In): 17.5 Inches (3/6/2019 10:10 AM)    General:  Alert and ambulatory, anxious affect.   HEENT:  No conjunctival pallor, moist mucous Membranes, neck is thick. Some thickening posteriorly without discoloration yet.  Pulmonary:  Normal respiratory effort, no cough, no audible wheezes/crackles.  CV:  Regular rate and Rhythm, no murmurs, pulses 2 plus/bounding c/w elevated BP.  Abdominal: obese, non tender, negative white sign.   Extremities: no edema or tremor  Skin:  No pallor or jaundice, no bruising, no striae  Pscyh/Mood: anxious affect.                                    LABS: \"Reviewed Care Everywhere labs and will check CMP.    Last recorded labs include:  No results found for: WBC, HGB, HCT, MCV, PLT   No results found for: [id number] No results found for: HGBA1C   No results found for: CHOL No results found for: PTH      No results found for: FERRITIN   No results found for: HDL   No results found for: JWESKHUC50 No results found for: 24736   No results found for: LDLCALC No results found for: TSH No results found for: FOLATE   No results found for: TRIG No results found for: ALT, AST, GGT, ALKPHOS, BILITOT No results found for: TESTOSTERONE     No components found for: CHOLHDL No results found for: 7597   @resusfast(vitamin a: 1)@       Other Notables/imaging:     Counselin minutes spent in direct consultation with the patient regarding conditions contributing to excess weight accumulation, with over 50% of the time spent in counseling, goal setting and initiating a plan to lose their excess weight.    Tobias Wilder MD  [facility].  3/6/2019                        "

## 2021-06-18 NOTE — LETTER
Letter by Tobias Wilder MD at      Author: Tobias Wilder MD Service: -- Author Type: --    Filed:  Encounter Date: 1/23/2019 Status: (Other)       1/23/2019      Nila Starks  [address] [place] [address]      Dear Lev Dotsoncome and thank you for your interest in the bariatric program at [facility]!     Your appointment is scheduled at our [place] Office on Wednesday March 6, 2019 at 10:30 AM with Dr. Tobias Wilder.  We ask that you arrive 45 minutes prior to your appointment time to complete your registration.   We strive to avoid clinic delays for our patients, so patients arriving late will need to reschedule.    Your first appointment will take about two hours.     In preparation for this appointment you will need to bring the following:      Your insurance card and photo identification    Completed health history form (enclosed).  Please make sure that you bring this form with you completed. There will not be time to complete this in the office so we will need to reschedule if you forget to do this.     All your medications in their original containers, including over-the-counter medications.    Any lab results that you have had done within the last 6 months.     If you are interested in our surgical program; call your insurance company prior to this visit, to verify that your specific insurance plan covers Weight-Loss Surgery and what your out-of-pocket costs may be.     Your appointment has been scheduled at our [place] Office- [address] [place] [address], [address], [place] [address].  [phone].    If you find yourself unable to keep this appointment, please call us to reschedule at your earliest convenience so we can accommodate other patients.    We are excited that you have chosen our program and look forward to serving you!    Sincerely,  [facility]

## 2021-06-24 NOTE — TELEPHONE ENCOUNTER
Central PA team  [phone]  Pool: HE PA MED ([id number])          PA has been initiated.       PA form completed and faxed insurance via Cover My Meds     Key:  [id number]     Medication:  Naltrexone    Ins[facility]:  [facility]        Response will be received via fax and may take up to 5-10 business days depending on plan

## 2021-06-24 NOTE — TELEPHONE ENCOUNTER
Prior Authorization Request  Who s requesting:  Pharmacy  Pharmacy Name and Location: [place], [place], [address] [facility] [facility].  Medication Name: Vitamin D 50,000 units twice weekly               Insurance Plan:  [employer]  Insurance Member ID Number:  [id number]  Informed patient that prior authorizations can take up to 10 business days for response:   No  Okay to leave a detailed message: Yes

## 2021-06-24 NOTE — TELEPHONE ENCOUNTER
Called and spoke with pharmacy, PA is not needed they are able to get a paid claim.     Patient has new rx coverage:    [employer];  Bin: [id number]  PCN: [id number]  ID: [id number]

## 2021-06-24 NOTE — TELEPHONE ENCOUNTER
Prior Authorization Request  Who s requesting:  Pharmacy  Pharmacy Name and Location: [place], [place], [address] [facility].  Medication Name: Naltrexone 50 mg                  Insurance Plan:  "[id number]"  Insurance Member ID Number:  [id number]  Informed patient that prior authorizations can take up to 10 business days for response:   No  Okay to leave a detailed message: Yes

## 2021-06-24 NOTE — TELEPHONE ENCOUNTER
PA not needed.  Medication is covered.  Called pharmacy they were able to get a paid claim.     Patient's current rx plan  [employer];  Bin: [id number]  PCN: [id number]  ID: [id number]

## 2021-06-24 NOTE — TELEPHONE ENCOUNTER
Central PA team  [phone]  Pool: HE PA MED ([id number])          PA has been initiated.       PA form completed and faxed insurance via Cover My Meds     Key:  [id number]     Medication:  d3    Insurance:  [employer]        Response will be received via fax and may take up to 5-10 business days depending on plan

## 2021-06-27 NOTE — PROGRESS NOTES
Progress Notes by Tobias Wilder MD at 3/6/2019 10:30 AM     Author: Tobias Wilder MD Service: -- Author Type: Physician    Filed: 3/6/2019 12:18 PM Encounter Date: 3/6/2019 Status: Signed    : Tobias Wilder MD (Physician)       [facility]:  Non-Surgical Weight Loss Intake Appointment   Date of visit: 3/6/2019  Physician: Tobias Wilder MD  Primary Care is Provider, No Primary Care.  Nila Starks   43 y.o.  female  Nila is a 43 y.o. year old female who is here today for consultation regarding non-surgical weight loss.   she was referred to the [facility] by her PCP, Dr. Glez.    Weight History:   Wt Readings from Last 3 Encounters:   03/06/19 (!) 242 lb (109.8 kg)     Body mass index is 45.73 kg/m .       Assessment and Plan   Assessment: Nila Starks is a 43 y.o.,female presenting for assistance with medical weight loss.  Identified issues contributing to her excess weight include:     Currently has poorly treated sleep apnea that she's following up in the next couple weeks. We discussed how her fragmented/insufficient sleep can impact morning appetite and affect energy for meal preparation and avoiding convenience eating.       She has a heavy reliance on using [employer] and fast food chains due to proximity to her work/lack of options and the above noted lack of energy towards meal preparation.    Her life is quite sedentary and at no point was interested in athletic pursuits and she has zero scheduled/intentional exercise.  We discussed that even adding 10 minutes daily can cut her mortality risks 30% and she's contemplative to starting a walking regimen in her down time while her daughter is at dance class in the evening or prior to her later start on Mon/Wed.     She's contemplative about surgical weight loss but doesn't know her insurance benefit for bariatric surgery.  Given her HIEN, elevated BP and BMI of 45 she would be criteria if her plan  does not have an exclusion.  She was given information on our online seminar.     She's open to use of appetite suppressants and given mildly elevated BP of late/anxious affect will favor trial of bupropion/naltrexone rather than phentermine.     Strong family hx of type II diabetes and A1c of 5.6% with some start of acanthosis to posterior neck crease. Diet/exercise change and weight loss should reduce her risk of developing type II diabetes in the next decade.      Plan:  1.  Behavior Goals: 3 daily meals plan, ideally with a large breakfast, medium lunch        and smaller dinner. Avoidance of sugared-sweetened beverages and processed        carbohydrate snacks.  Work towards pre-planning meals to avoid falling back into old             habits/obesogenic habits.  Daily Food Tracking and morning weight recommended.  [facility]       [facility] to increase [facility].    2.  Diet Goals: Recommend a protein goal of 75g/day to start until she meets w/ dietician.    Increased protein intake to insure at least 20-25 grams of protein per meal.      3.  Exercise/Activity Goals: start walking 10 minutes most days of the week minimum.  Long term goal of increasing endurance to allow for at least 150 to 200 minutes weekly of moderate exercise to maintain weight loss.      4.  Recommendation regarding weight loss medication:  Bupropion/naltrexone trial started.    5.  Referral to Dietician for further education and customization of diet plan.    6.  Stress Reduction: walking encouraged.    7.  Intake Labs:  Reviewed care everywhere that showed very low vitamin D, normal TSH, B12 levels and A1c of 5.6%.    8.   Low vitamin D. To boost levels into thenormal range twice weekly D2 rx'd for 3 months and then would sustain levels w/ 2000-4000IUs daily of D3.     9. Sleep apnea hx: follow up as planned w/ sleep medicine as good behavior change/weight loss is unlikely without quality/restorative sleep.    There are no  "diagnoses linked to this encounter.     No Follow-up on file.     Weight and Lifestyle History    Sleep history:  Goes to bed around midnight. Up and down frequently to use bathroom, 3-4 times nightly no snacks. Wakes for the day on Mon/Wed will sleep in, otherwise up at 6:45-7am. Once awake, gets dress (bathes at night), no time to eat so skipped heads to work, commute is 15 minutes. 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 May have a [employer]rach[employer]. Subway for lunch: turkey sub 6 inch, w/ water occ cookies. Will get off anywhere from 4:15 to 5pm, heads to  her 11 yo daughter from [employer]. May be at Dance until 8-9pm , may go out for supper once weekly (Tuesdays).  On Mondays, works 12:30pm to 9pm.  Will have fast food on the way home from dance .   On Mondays, no morning food, maybe water in the AM and [employer]. Break is at 3pm goes to subway (convenient). Fast food or cereal at home. Other days, just sits and watches dance until done at 8-9pm. After dance, will do chores. No real hobbies. Used to do more crafting when kids were younger.   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 [place]   Hours per night: 6-7, interrupted  Snoring/apnea/insomnia? yes  Restful/refreshed? no  Shift work? yes  CPAP/BiPAP? no  Sleep study previously? yes  TV in bedroom? no  Sleep aids/pills? no      STOP-BANG score for sleep apnea : na  For general population   HIEN - Low Risk : Yes to 0 - 2 questions  HIEN - Intermediate Risk : Yes to 3 - 4 questions  HIEN - High Risk : Yes to 5 - 8 questions  or Yes to 2 or more of 4 STOP questions + male gender  or Yes to 2 or more of 4 STOP questions + BMI > 35kg/m2  or Yes to 2 or more of 4 STOP questions + neck circumference 17 inches / 43cm in male or 16 inches / 41cm in female    Weight History:  In what way is your excess weight affecting your wellness/health? Aches/pains  Heaviest weight: near current levels  Any Previous weight " loss, what was the most lost and method: na  Birth weIight, High School graduation weight: na  Lowest adult weight: na  Maternal health/smoking/weight: na  When did you start gaining weight and what were the circumstances? na  Is anyone else in your immediate family overweight? yes  Finally,  Is there a weight you desire to be, what is your goal weight that would define successful weight loss for you? na  I would like to lose weight so I can?:  Feel better.    .  Feels she's not desireable to her     Barriers to weight loss:  Is anyone else at home/work/family a barrier to your weight loss? no  Work schedule/location? See above  Current stressors include: family/work balance  Mobility problems: no    Counseled on health benefits of weight loss, goals for metabolic/diabetic risk reduction, HTN reduction, improved sleep and mobility, cancer reduction, longevity.    Fitness History:  Were you ever an athlete?no     Which sports? no  When was the last time you walked/hiked a mile?na  Do you have any limitations for activity?no  Do you have access to a gym, pool, club, fitness center, or ?no                Do you have any fitness goals/dreams that could motivate your weight loss?no    On a scale from 0-10,  how willing are you to start some sort of movement/exercise regimen? contemplative    Counseled on physiologic benefits of exercise and for long term weight maintenance, goal working towards 200-300 minutes weekly.     Food History:  Who buys groceries?  She does  Do you eat at dinner table, is the TV on or off, family present? On the go eating almost always  Any soda, how much? na  Meals per day? 2  Snacks per day? occ Karlos bar  Breakfast typically is: skipped or Karlos bar  Lunch typically is: subway most days  Dinner  is: fast food often  Weekly Fast Food/dining out: 3-6x weekly  Snacks consist of: karlos bar    Food sensitivities/allergies/intolerances/avoidances: na  Water per day? unsure    Any binging  "behavior?no  Any induced vomiting/purging? no  Any history of anorexia/bulimia? no  Any night eating issues? no  Stress induced eating? no    Goal Setting:  Short Term Goals 10% weight reduction is 24 lbs, under 218 lbs  Long Term Goals BMI under 30, improved A1c, triglycerides.         Patient Profile   Social History     Social History Narrative   ? Not on file     Family History   Problem Relation Age of Onset   ? Diabetes Mother    ? Hyperlipidemia Mother    ? Hypertension Father    ? Diabetes Father    ? Prostate cancer Father    ? Diabetes Sister           Past Medical History   There is no problem list on file for this patient.    Penicillins  Current Outpatient Medications   Medication Sig   ? cholecalciferol, vitamin D3, (VITAMIN D3) 1,000 unit capsule Take 1,000 Units by mouth daily.   ? vitamin E 200 UNIT capsule Take 100 Units by mouth.       Past Surgical History  She has a past surgical history that includes  section, classic.     Examination   /84 (Patient Site: Right Arm, Patient Position: Sitting, Cuff Size: Adult Large)   Pulse 70   Ht 5' 1\" (1.549 m)   Wt (!) 242 lb (109.8 kg)   SpO2 98%   Breastfeeding? No   BMI 45.73 kg/m    Height: 5' 1\" (1.549 m) (3/6/2019 10:10 AM)  Initial Weight: 242 lbs (3/6/2019 10:10 AM)  Weight: (!) 242 lb (109.8 kg) (3/6/2019 10:10 AM)  Weight loss from initial: 0 (3/6/2019 10:10 AM)  % Weight loss: 0 % (3/6/2019 10:10 AM)  BMI (Calculated): 45.7 (3/6/2019 10:10 AM)  SpO2: 98 % (3/6/2019 10:10 AM)  Waist Circumference (In): 48 Inches (3/6/2019 10:10 AM)  Hip Circumference (In): 52.25 Inches (3/6/2019 10:10 AM)  Neck Circumference (In): 17.5 Inches (3/6/2019 10:10 AM)    General:  Alert and ambulatory, anxious affect.   HEENT:  No conjunctival pallor, moist mucous Membranes, neck is thick. Some thickening posteriorly without discoloration yet.  Pulmonary:  Normal respiratory effort, no cough, no audible wheezes/crackles.  CV:  Regular rate and " "Rhythm, no murmurs, pulses 2 plus/bounding c/w elevated BP.  Abdominal: obese, non tender, negative white sign.   Extremities: no edema or tremor  Skin:  No pallor or jaundice, no bruising, no striae  Pscyh/Mood: anxious affect.                                    LABS: \"Reviewed Care Everywhere labs and will check CMP.    Last recorded labs include:  No results found for: WBC, HGB, HCT, MCV, PLT   No results found for: GNKPIWST33XI No results found for: HGBA1C   No results found for: CHOL No results found for: PTH      No results found for: FERRITIN   No results found for: HDL   No results found for: KGRWFDNX70 No results found for: 09022   No results found for: LDLCALC No results found for: TSH No results found for: FOLATE   No results found for: TRIG No results found for: ALT, AST, GGT, ALKPHOS, BILITOT No results found for: TESTOSTERONE     No components found for: CHOLHDL No results found for: 7597   @resusfast(vitamin a: 1)@       Other Notables/imaging:     Counselin minutes spent in direct consultation with the patient regarding conditions contributing to excess weight accumulation, with over 50% of the time spent in counseling, goal setting and initiating a plan to lose their excess weight.    Tobias Wilder MD  [facility].  3/6/2019                        "

## 2021-07-24 ENCOUNTER — HEALTH MAINTENANCE LETTER (OUTPATIENT)
Age: 46
End: 2021-07-24

## 2021-09-18 ENCOUNTER — HEALTH MAINTENANCE LETTER (OUTPATIENT)
Age: 46
End: 2021-09-18

## 2022-03-15 DIAGNOSIS — N92.4 EXCESSIVE BLEEDING IN PREMENOPAUSAL PERIOD: ICD-10-CM

## 2022-03-15 RX ORDER — TRANEXAMIC ACID 650 MG/1
1300 TABLET ORAL 2 TIMES DAILY
Qty: 30 TABLET | Refills: 1 | Status: SHIPPED | OUTPATIENT
Start: 2022-03-15

## 2022-03-15 NOTE — TELEPHONE ENCOUNTER
Last Written Prescription Date:  8/25/20  Last Fill Quantity: 30,  # refills: 1   Last office visit: 8/25/2020 with prescribing provider:  Asher Soto Office Visit:none pending    Requested Prescriptions   Pending Prescriptions Disp Refills     tranexamic acid (LYSTEDA) 650 MG tablet 30 tablet 1     Sig: Take 2 tablets (1,300 mg) by mouth 2 times daily When having heavy vaginal bleeding       There is no refill protocol information for this order        Routed to physician to review and advise.     Chel Mcgovern RN on 3/15/2022 at 12:42 PM

## 2022-06-21 ENCOUNTER — HOSPITAL ENCOUNTER (EMERGENCY)
Facility: CLINIC | Age: 47
Discharge: LEFT BEFORE DISCHARGE | End: 2022-06-21
Payer: COMMERCIAL

## 2022-08-14 ENCOUNTER — HEALTH MAINTENANCE LETTER (OUTPATIENT)
Age: 47
End: 2022-08-14

## 2022-11-19 ENCOUNTER — HEALTH MAINTENANCE LETTER (OUTPATIENT)
Age: 47
End: 2022-11-19

## 2023-09-10 ENCOUNTER — HEALTH MAINTENANCE LETTER (OUTPATIENT)
Age: 48
End: 2023-09-10

## 2024-09-17 ENCOUNTER — TRANSCRIBE ORDERS (OUTPATIENT)
Dept: OTHER | Age: 49
End: 2024-09-17

## 2024-09-17 ENCOUNTER — PATIENT OUTREACH (OUTPATIENT)
Dept: ONCOLOGY | Facility: CLINIC | Age: 49
End: 2024-09-17
Payer: COMMERCIAL

## 2024-09-17 DIAGNOSIS — C50.912 MALIGNANT NEOPLASM OF LEFT FEMALE BREAST, UNSPECIFIED ESTROGEN RECEPTOR STATUS, UNSPECIFIED SITE OF BREAST (H): Primary | ICD-10-CM

## 2024-09-17 NOTE — PROGRESS NOTES
New Patient Oncology Nurse Navigator Note     Referring provider: Dr Meredith Bettencourt      Referring Clinic/Organization: [facility] in [place]      Referred to (specialty:) Radiation Oncology      Date Referral Received: September 17, 2024     Evaluation for:  C50.912 (ICD-10-CM) - Malignant neoplasm of left female breast, unspecified estrogen receptor status, unspecified site of breast (H)      Clinical History (per Nurse review of records provided):      Nila Starks is a 49 year old female who presented for screening mammogram on 7/1/24 revealing an asymmetry in the left breast at the 3 o'clock position at posterior depth. Ultrasound showed 2 indeterminate masses in the left breast, the larger of which appears suspicious. A second mass may represent a lymph node with cortical thickening. Ultrasound-guided biopsy of both is recommended.     7/22/24 - Case: [id number]       A) LEFT BREAST, 2:00, 5 CM FROM NIPPLE, ULTRASOUND-GUIDED CORE BIOPSY:   1. Invasive ductal carcinoma      a. Detroit grade: III of III; Anish score: 8 of 9      b. Angio-lymphatic invasion: Absent      c. Associated DCIS: Present      d. Subtype: Cribriform and Solid with cysts      e. Grade of DCIS: 3 of 3   2. Breast Ancillary Testing:       a. Hormone Receptors:             Estrogen receptor: Positive (92%, strong staining)             Progesterone receptor: Positive (59%, moderate staining)       b. HER2 by IHC: Negative (1+ by manual morphometry)          c. Ki-67: 32% by image analysis     B) LEFT BREAST, 3:00, 9 CM FROM NIPPLE, ULTRASOUND-GUIDED CORE BIOPSY:   1. Fragments of reactive lymph node   2. Negative for metastatic carcinoma in this sampling     9/10/24 - Case: [id number]     A) LEFT BREAST, LUMPECTOMY:  1. Invasive ductal carcinoma, Detroit grade III of III      a. Size: 21 mm      b. Core biopsy site is associated with tumor      c. Lymphovascular space involvement present  2. Ductal carcinoma in  situ (DCIS), nuclear grade 3, Cribriform, Solid, and Comedo types  3. Margins:      a. Invasive carcinoma is 2 mm from the anterior margin (see parts C-E for additional margins)      b. DCIS is 5 mm from the posterior margin (see parts C-E for additional margins)  4. Focal lobular carcinoma in situ  (LCIS), classic type  5. Breast Ancillary Testing: Performed on prior case ([id number])      a. Hormone Receptors:            Estrogen receptor: Positive (92%, strong staining)            Progesterone receptor: Positive (59%, moderate staining)      b. HER2 by IHC: Negative (1+ by manual morphometry)      c. Ki67: 32% by image analysis  6. Surrounding breast with fibrocystic change      B) LYMPH NODES, LEFT AXILLARY, SENTINEL LYMPH NODE BIOPSY:  1. One of five lymph nodes positive for metastatic carcinoma:     a. Size of largest contiguous metastasis: 2.1 mm     b. Extracapsular extension: Absent    C) LEFT BREAST, ADDITIONAL ANTERIOR MARGIN, EXCISION:  1. Benign breast tissue with focal ductal hyperplasia  2. Negative for atypia and malignancy    D) LEFT BREAST, ADDITIONAL MEDIAL MARGIN, EXCISION:  1. Benign breast tissue  2. Negative for atypia and malignancy    E) LEFT BREAST, ADDITIONAL POSTERIOR MARGIN, EXCISION:  1. Benign breast tissue  2. Negative for atypia and malignancy     Genetic counseling  at [facility] had been attempting to reach patient for personal and family history. Did she ever get scheduled with genetics or return that call from [facility]?    Oncotype RS = 28    10/1 - Med onc consult with Dr. Kaia Villatoro - Adjuvant chemotherapy planned with anticipated duration of treatment: 4-6 cycles of Taxotere/Cytoxan.     PET is ordered.     CLARIFY IF HER MOBILE NUMBER CAN BE PREFERRED NUMBER NOW AND IF HOME NUMBER IS STILL GOOD OR DELETE?    Patient resides in [place] and requested [place] location.     9/17 1532 - Telephoned patient at [phone] and received message that call could not be  completed. Hang up and try again later.     9/18 1014 - Telephoned patient at [phone] and received message that call could not be completed. Hang up and try again later.     9/18 1016 - Telephoned patient at [phone] (mobile) and left voice message.   9/24 0953 - Sent [address] message requesting call back.   9/26 1049 - Telephoned patient at [phone] and received message that call could not be completed. Hang up and try again later.   9/26 1049 - Telephoned patient at [phone] (mobile) and left voice message requesting call back.     Records Location: Care Everywhere, Media, and See Bookmarked material     Records Needed:  Path reports-biopsy and surgery (per protocol)  Pathology reviews (per protocol)  Breast imaging for past 5 years  Systemic imaging (per protocol)  Med onc notes, rad notes, OP note, surgeons note (per protocol)  Genetic results (per protocol)  Echo or MUGA results (per protocol)  Radiation summary (per protocol)  Chemotherapy summary (per protocol)

## 2024-10-02 ENCOUNTER — PRE VISIT (OUTPATIENT)
Dept: ONCOLOGY | Facility: CLINIC | Age: 49
End: 2024-10-02
Payer: COMMERCIAL

## 2024-10-02 NOTE — TELEPHONE ENCOUNTER
MEDICAL RECORDS REQUEST   Radiation Oncology  [address] [place] [address]  Fax: [phone]          FUTURE VISIT INFORMATION                                                   Nila Starks, : 1975 scheduled for future visit at [facility] Radiation Oncology    RECORDS REQUESTED FOR VISIT                                                     BREAST STATUS DETAILS   OFFICE NOTE from medical oncologist [facility] 10/01/24: Dr. Kaia Villatoro   OFFICE NOTE from surgeon/plastic surgeon [facility] 24: Dr. Meredith Bettencourt   CHEMOTHERAPY NOTES/LOG     OPERATIVE/BREAST BIOPSY REPORTS [facility] 09/10/24: Left Magnetic seed-localized left breast lumpectomy with left sentinel lymph node biopsy with blue dye    MEDICATION LIST [facility]    LABS     PATHOLOGY REPORTS Report in [facility] 09/10/24: [id number]  24: [id number]   ANYTHING RELATED TO DIAGNOSIS [facility] Most recent 10/01/24   IMAGING (NEED IMAGES & REPORT)     CT SCANS     MRI     MAMMO/SURGICAL BREAST IMGS/SPECIMEN RADIOGRAPH Req [facility]:  24-24   XRAYS     ULTRASOUND Req 10/[facility]:  24-24: US Breast   PET     PREVIOUS RADIATION     WHAT HEALTHCARE FACILITY     RADIATION ONCOLOGIST NOTES     RADIATION TREATMENT SUMMARY NOTES        *[facility] and [facility] Radiation Oncology patients send to [facility] with ATTN: MANUELA/Matt Dosi/Physics    *only [facility] patient's, use [facility]

## 2024-11-03 ENCOUNTER — HEALTH MAINTENANCE LETTER (OUTPATIENT)
Age: 49
End: 2024-11-03

## 2025-01-08 ENCOUNTER — TRANSCRIBE ORDERS (OUTPATIENT)
Dept: OTHER | Age: 50
End: 2025-01-08

## 2025-01-08 DIAGNOSIS — C50.912 MALIGNANT NEOPLASM OF LEFT FEMALE BREAST, UNSPECIFIED ESTROGEN RECEPTOR STATUS, UNSPECIFIED SITE OF BREAST (H): Primary | ICD-10-CM

## 2025-02-03 NOTE — TELEPHONE ENCOUNTER
MEDICAL RECORDS REQUEST   Radiation Oncology  [address] [place] [address]  Fax: [phone]          FUTURE VISIT INFORMATION                                                   Nila Starks, : 1975 scheduled for future visit at [facility] Radiation Oncology    RECORDS REQUESTED FOR VISIT                                                     BREAST     OFFICE NOTE from medical oncologist [facility] 2025 - Dr. Kaia Villatoro   OFFICE NOTE from surgeon/plastic surgeon [facility] 2024 - Dr. Meredith Bettencourt   CHEMOTHERAPY NOTES/LOG [facility] 2025   OPERATIVE/BREAST BIOPSY REPORTS -[facility] 9/10/2024 - Left Wire-localiased or Magnetic seed-localized left breast lumpectomy with left sentinel lymph node biopsy with blue dye     MEDICATION LIST -[facility]    LABS     PATHOLOGY REPORTS Report in [facility] 9/10/2024 - [id number]   2024 - [id number]    ANYTHING RELATED TO DIAGNOSIS [facility] 2025   IMAGING (NEED IMAGES & REPORT)     [facility] 10/11/2024   MRI     MAMMO/SURGICAL BREAST IMGS/SPECIMEN RADIOGRAPH Req from [facility] 2024, 2024, 2024   XRAYS Req from [facility] Xray Breast: 9/10/2024   ULTRASOUND Req from [facility] US Breast: 2024, 2024, 2024  US Parathyroid: 2024   PET

## 2025-02-04 ENCOUNTER — OFFICE VISIT (OUTPATIENT)
Dept: RADIATION THERAPY | Facility: OUTPATIENT CENTER | Age: 50
End: 2025-02-04
Payer: COMMERCIAL

## 2025-02-04 ENCOUNTER — PRE VISIT (OUTPATIENT)
Dept: RADIATION THERAPY | Facility: OUTPATIENT CENTER | Age: 50
End: 2025-02-04

## 2025-02-04 VITALS
SYSTOLIC BLOOD PRESSURE: 187 MMHG | BODY MASS INDEX: 51.79 KG/M2 | OXYGEN SATURATION: 92 % | WEIGHT: 265.2 LBS | RESPIRATION RATE: 16 BRPM | DIASTOLIC BLOOD PRESSURE: 79 MMHG | HEART RATE: 94 BPM

## 2025-02-04 DIAGNOSIS — C50.919 BREAST CANCER (H): Primary | ICD-10-CM

## 2025-02-04 RX ORDER — LEVOTHYROXINE SODIUM 75 UG/1
75 TABLET ORAL DAILY
COMMUNITY
Start: 2024-10-03

## 2025-02-04 RX ORDER — AMLODIPINE BESYLATE 5 MG/1
5 TABLET ORAL DAILY
COMMUNITY
Start: 2024-04-02

## 2025-02-04 RX ORDER — METFORMIN HYDROCHLORIDE 500 MG/1
1500 TABLET, EXTENDED RELEASE ORAL
COMMUNITY
Start: 2025-01-09

## 2025-02-04 NOTE — PROGRESS NOTES
Department of Radiation Oncology  Radiation Therapy Center  [facility]  [address], [address]  [place] [address]  [phone]       Consultation Note    Name: Nila Starks MRN: [id number]   : 1975   Date of Service: 2025  Referring: Dr. Villatoro and Dr. Bettencourt     Reason for consultation: Invasive ductal carcinoma of the left breast status postlumpectomy and sentinel lymph node evaluation.  Final pathology demonstrated IDC, grade 3, 21 mm, positive DCIS, positive LVSI (focal), negative margin, 1 out of 5 sentinel lymph node positive (2.1 mm, no JEN), ER positive, NC positive, HER2 negative, pathologic T2 N1, Oncotype Dx recurrence score equals 28.  Evaluate potential role for radiation therapy.    History of Present Illness   Ms. Starks is a 49 year old female with a diagnosis of left breast cancer.    The patient presented with bilateral screening mammogram in 2024 which demonstrated a 2 cm mass in the left breast.  There is an additional mass measuring 8 mm which is possibly represented a small lymph node.  On 2024 ultrasound-guided biopsy of the left breast mass at 2 o'clock position, 5 cm from the nipple demonstrated invasive ductal carcinoma, grade 3, ER positive, NC positive, HER2 negative.  Ultrasound-guided biopsy of the additional noted mass demonstrated fragments of reactive lymph node, negative for metastatic carcinoma.  The patient underwent left breast lumpectomy and sentinel lymph node evaluation by Dr. Bettencourt on 9/10/2024.  Final pathology demonstrated IDC, grade 3, 21 mm, positive DCIS, positive LVSI (focal), negative margin, 1 out of 5 sentinel lymph node positive (2.1 mm, no JEN), ER positive, NC positive, HER2 negative, pathologic T2 N1.  Oncotype DX recurrence score equals 28.  The patient with started on systemic chemotherapy with Taxotere and Cytoxan under the care of of Dr. Villatoro.  The patient completed chemotherapy on 2025.   She presents today discuss potential radiation therapy as a part of her treatment strategy.    Patient is overall doing fairly well.  No acute complaints.  Feels she had tolerated chemotherapy fairly well.  No prior radiation.  No pacemaker.  No history of connective tissue disorder.    Past Medical History:   Past Medical History:   Diagnosis Date    Abnormal maternal glucose tolerance, complicating pregnancy, childbirth, or the puerperium, unspecified as to episode of care     GERD (gastroesophageal reflux disease)     GERD (gastroesophageal reflux disease)     History of elevated blood pressure while in hospital     Hypertriglyceridemia     Hypertrophy of tonsils alone 12/15/2007    Leg swelling     Morbid obesity (H)     Morbid obesity (H)     Numbness and tingling     Numbness and tingling     PONV (postoperative nausea and vomiting)     Sleep apnea     HIEN    Sleep apnea        Past Surgical History:   Past Surgical History:   Procedure Laterality Date     SECTION      X4    HC DILATION/CURETTAGE DIAG/THER NON OB  2002    Missed Ab    HERNIA REPAIR      incisional she believes following one of her c/s    TONSILLECTOMY      ZZC  DELIVERY ONLY  3/98, ,        Chemotherapy History:  Per HPI    Radiation History:  None    Pregnant: Not Applicable  Implanted Cardiac Devices: No    Medications:  Current Outpatient Medications   Medication Sig Dispense Refill    tranexamic acid (LYSTEDA) 650 MG tablet Take 2 tablets (1,300 mg) by mouth 2 times daily When having heavy vaginal bleeding 30 tablet 1     No current facility-administered medications for this visit.         Allergies:     Allergies   Allergen Reactions    Pcn [Penicillins] Rash           Family History:  Family History   Problem Relation Age of Onset    Diabetes Mother     Diabetes Father     Hypertension Father     Prostate Cancer Father     Prostate Problems Father     Skin Cancer Sister     Diabetes Sister     Hyperlipidemia  Mother     Diabetes Sister        Review of Systems   A 10-point review of systems was performed. Pertinent findings are noted in the HPI.    Physical Exam   ECOG Status: 1    Vitals:  BP (!) 187/79 (BP Location: Right arm, Patient Position: Sitting, Cuff Size: Adult Regular)   Pulse 94   Resp 16   Wt 120.3 kg (265 lb 3.2 oz)   SpO2 92%   BMI 51.79 kg/m      Gen: Alert, in NAD  Head: NC/AT  Eyes: PERRL, EOMI, sclera anicteric  Ears: No external auricular lesions  Nose/sinus: No rhinorrhea or epistaxis  Oral cavity/oropharynx: MMM, no visible oral cavity lesions, FOM and BOT are soft to palpation  Neck: Full ROM, supple, no palpable adenopathy  Pulm: No wheezing, stridor or respiratory distress  CV: Extremities are warm and well-perfused, no cyanosis, no pedal edema  Abdominal: Normal bowel sounds, soft, nontender, no masses  Musculoskeletal: Normal bulk and tone  Skin: Normal color and turgor  Neuro: A/Ox3, CN II-XII intact, normal gait    Imaging/Path/Labs   Imaging:   Per HPI    Path:   9/10/24  INVASIVE CARCINOMA OF THE BREAST: Resection   INVASIVE CARCINOMA OF THE BREAST: RESECTION - All Specimens   8th Edition - Protocol posted: 12/13/2023     SPECIMEN     Procedure:    Excision (less than total mastectomy)      Specimen Laterality:    Left     TUMOR     Tumor Site:    Clock position      :    2 o'clock    Tumor Site:    Distance from nipple (Centimeters): 5 cm    Histologic Type:    Invasive carcinoma of no special type (ductal)    Histologic Grade ([place] Histologic Score):         Glandular (Acinar) / Tubular Differentiation:    Score 3      Nuclear Pleomorphism:    Score 3      Mitotic Rate:    Score 2      Overall Grade:    Grade 3 (scores of 8 or 9)    Tumor Size:    Greatest dimension of largest invasive focus (Millimeters): 21 mm      Additional Dimension (Millimeters):    20 mm      Additional Dimension (Millimeters):    16 mm    Tumor Focality:    Single focus of invasive carcinoma    Ductal  "Carcinoma In Situ (DCIS):    Present      :    Negative for extensive intraductal component (EIC)      Architectural Patterns:    Comedo      Architectural Patterns:    Cribriform      Architectural Patterns:    Solid      Nuclear Grade:    Grade III (high)      Necrosis:    Present, central (expansive \"comedo\" necrosis)    Lobular Carcinoma In Situ (LCIS):    Present    Lymphatic and / or Vascular Invasion:    Present      :    Focal    Dermal Lymphatic and / or Vascular Invasion:    No skin present    Microcalcifications:    Present in invasive carcinoma    Microcalcifications:    Present in non-neoplastic tissue    Treatment Effect in the Breast:    No known presurgical therapy     MARGINS    Margin Status for Invasive Carcinoma:    All margins negative for invasive carcinoma      Distance from Invasive Carcinoma to Closest Margin:    7 mm      Closest Margin(s) to Invasive Carcinoma:    Inferior      Distance from Invasive Carcinoma to Anterior Margin:    9 mm      Distance from Invasive Carcinoma to Posterior Margin:    10 mm      Distance from Invasive Carcinoma to Superior Margin:    19 mm      Distance from Invasive Carcinoma to Inferior Margin:    7 mm      Distance from Invasive Carcinoma to Medial Margin:    9 mm      Distance from Invasive Carcinoma to Lateral Margin:    10 mm    Margin Status for DCIS:    All margins negative for DCIS      Distance from DCIS to Closest Margin:    7 mm      Closest Margin(s) to DCIS:    Inferior     REGIONAL LYMPH NODES    Regional Lymph Node Status:         :    Tumor present in regional lymph node(s)        Number of Lymph Nodes with Macrometastases:    1        Number of Lymph Nodes with Micrometastases:    0        Number of Lymph Nodes with Isolated Tumor Cells:    0        Size of Largest Nevaeh Metastatic Deposit:    2.1 mm        Extranodal Extension:    Not identified      Total Number of Lymph Nodes Examined (sentinel and non-sentinel):    5      Number of " [place] Nodes Examined:    5     pTNM CLASSIFICATION (AJCC 8th Edition)      Reporting of pT, pN, and (when applicable) pM categories is based on information available to the pathologist at the time the report is issued. As per the AJCC (Chapter 1, 8th Ed.) it is the managing physician s responsibility to establish the final pathologic stage based upon all pertinent information, including but potentially not limited to this pathology report.    pT Category:    pT2    pN Category:    pN1a         Assessment    Ms. Starks is a 49 year old female with a diagnosis of invasive ductal carcinoma of the left breast status postlumpectomy and sentinel lymph node evaluation.  Final pathology demonstrated IDC, grade 3, 21 mm, positive DCIS, positive LVSI (focal), negative margin, 1 out of 5 sentinel lymph node positive (2.1 mm, no JEN), ER positive, AK positive, HER2 negative, pathologic T2 N1, Oncotype Dx recurrence score equals 28.  Evaluate potential role for radiation therapy.    Plan   We recommend adjuvant radiation to the whole breast using high tangents. We discussed the role of radiation in the management of patients with positive sentinel lymph node biopsy who did not receive surgical management of their axilla. Long-term follow-up from ACOSOG Z11 ([facility] 2016), a trial comparing axillary node dissection vs no dissection in patients receiving lumpectomy with 1-3 positive sentinel nodes, such as this patient, revealed no difference in locoregional recurrence at 10 years (6.2% v 5.3%, p=0.36). All patients in this study had lumpectomy followed by radiation. Of note, per-protocol radiation was only to the whole breast. We also discussed potential treatment fields. There is controversy over which fields to treat in patients with positive sentinel lymph node biopsy, and the recommendation is to individualize treatment for each patient. In this patient's case,  we propose to treat with tangents alone, without addition  of a third field for SCV coverage.    We recommend hypofractionated radiation therapy based on results from the [place] hypofractionation trial (Mary Jane et al, NEJ, 2010) and the recent update of the [place] START trial (Royce et al, Lancet Oncol, 2013), which both show equivalent local control, survival and cosmesis with these shorter treatment schedules as compared to conventional radiation fractionation. After our discussion, the patient is interested in proceeding with hypofractionated treatment to the whole breast. We will plan to treat to 4005 cGy in 15 fractions with a 1000 cGy in 5 fraction boost.    We also discussed the logistics of treatment planning and delivery in detail with the patient. We furthermore discussed side effects, including short term risks of fatigue and skin reaction, and long term risks of pneumonitis, lung fibrosis, soft tissue fibrosis, skin changes, breast contour changes, rib fractures, cardiac damage, secondary cancers, lymphedema, and thyroid dysfunction.  We described the use of 3D-conformal radiotherapy to minimize dose to the normal tissues, while adequately covering the target tissues, and the ability of this technique to decrease potential for toxicity.    The patient agrees with the current plan.  She was to proceed with radiation.  Will schedule the patient for CT simulation.  Consent obtained.    60 minutes spent on the date of the encounter doing chart review, review of outside records, review of test results, interpretation of tests, patient visit, documentation, discussion, and plan.      Yariel Torres MD  Department of Radiation Oncology  [facility]

## 2025-02-04 NOTE — LETTER
2025      Nila Starks  [address]  [facility] [address]      Dear Colleague,    Thank you for referring your patient, Nila Starks, to the [facility] RADIATION THERAPY CLINIC. Please see a copy of my visit note below.       Department of Radiation Oncology  Radiation Therapy Center  [facility]  [address], [address]  [place] [address] [phone]       Consultation Note    Name: Nila Starks MRN: [id number]   : 1975   Date of Service: 2025  Referring: Dr. Villatoro and Dr. Bettencourt     Reason for consultation: Invasive ductal carcinoma of the left breast status postlumpectomy and sentinel lymph node evaluation.  Final pathology demonstrated IDC, grade 3, 21 mm, positive DCIS, positive LVSI (focal), negative margin, 1 out of 5 sentinel lymph node positive (2.1 mm, no JEN), ER positive, FL positive, HER2 negative, pathologic T2 N1, Oncotype Dx recurrence score equals 28.  Evaluate potential role for radiation therapy.    History of Present Illness   Ms. Starks is a 49 year old female with a diagnosis of left breast cancer.    The patient presented with bilateral screening mammogram in 2024 which demonstrated a 2 cm mass in the left breast.  There is an additional mass measuring 8 mm which is possibly represented a small lymph node.  On 2024 ultrasound-guided biopsy of the left breast mass at 2 o'clock position, 5 cm from the nipple demonstrated invasive ductal carcinoma, grade 3, ER positive, FL positive, HER2 negative.  Ultrasound-guided biopsy of the additional noted mass demonstrated fragments of reactive lymph node, negative for metastatic carcinoma.  The patient underwent left breast lumpectomy and sentinel lymph node evaluation by Dr. Bettencourt on 9/10/2024.  Final pathology demonstrated IDC, grade 3, 21 mm, positive DCIS, positive LVSI (focal), negative margin, 1 out of 5 sentinel lymph node positive (2.1 mm, no JEN), ER  positive, SD positive, HER2 negative, pathologic T2 N1.  Oncotype DX recurrence score equals 28.  The patient with started on systemic chemotherapy with Taxotere and Cytoxan under the care of of Dr. Villatoro.  The patient completed chemotherapy on 2025.  She presents today discuss potential radiation therapy as a part of her treatment strategy.    Patient is overall doing fairly well.  No acute complaints.  Feels she had tolerated chemotherapy fairly well.  No prior radiation.  No pacemaker.  No history of connective tissue disorder.    Past Medical History:   Past Medical History:   Diagnosis Date     Abnormal maternal glucose tolerance, complicating pregnancy, childbirth, or the puerperium, unspecified as to episode of care      GERD (gastroesophageal reflux disease)      GERD (gastroesophageal reflux disease)      History of elevated blood pressure while in hospital      Hypertriglyceridemia      Hypertrophy of tonsils alone 12/15/2007     Leg swelling      Morbid obesity (H)      Morbid obesity (H)      Numbness and tingling      Numbness and tingling      PONV (postoperative nausea and vomiting)      Sleep apnea     HIEN     Sleep apnea        Past Surgical History:   Past Surgical History:   Procedure Laterality Date      SECTION      X4     HC DILATION/CURETTAGE DIAG/THER NON OB  2002    Missed Ab     HERNIA REPAIR      incisional she believes following one of her c/s     TONSILLECTOMY       Z  DELIVERY ONLY  3/98, ,        Chemotherapy History:  Per HPI    Radiation History:  None    Pregnant: Not Applicable  Implanted Cardiac Devices: No    Medications:  Current Outpatient Medications   Medication Sig Dispense Refill     tranexamic acid (LYSTEDA) 650 MG tablet Take 2 tablets (1,300 mg) by mouth 2 times daily When having heavy vaginal bleeding 30 tablet 1     No current facility-administered medications for this visit.         Allergies:     Allergies   Allergen Reactions     Pcn  [Penicillins] Rash           Family History:  Family History   Problem Relation Age of Onset     Diabetes Mother      Diabetes Father      Hypertension Father      Prostate Cancer Father      Prostate Problems Father      Skin Cancer Sister      Diabetes Sister      Hyperlipidemia Mother      Diabetes Sister        Review of Systems   A 10-point review of systems was performed. Pertinent findings are noted in the HPI.    Physical Exam   ECOG Status: 1    Vitals:  BP (!) 187/79 (BP Location: Right arm, Patient Position: Sitting, Cuff Size: Adult Regular)   Pulse 94   Resp 16   Wt 120.3 kg (265 lb 3.2 oz)   SpO2 92%   BMI 51.79 kg/m      Gen: Alert, in NAD  Head: NC/AT  Eyes: PERRL, EOMI, sclera anicteric  Ears: No external auricular lesions  Nose/sinus: No rhinorrhea or epistaxis  Oral cavity/oropharynx: MMM, no visible oral cavity lesions, FOM and BOT are soft to palpation  Neck: Full ROM, supple, no palpable adenopathy  Pulm: No wheezing, stridor or respiratory distress  CV: Extremities are warm and well-perfused, no cyanosis, no pedal edema  Abdominal: Normal bowel sounds, soft, nontender, no masses  Musculoskeletal: Normal bulk and tone  Skin: Normal color and turgor  Neuro: A/Ox3, CN II-XII intact, normal gait    Imaging/Path/Labs   Imaging:   Per HPI    Path:   9/10/24  INVASIVE CARCINOMA OF THE BREAST: Resection   INVASIVE CARCINOMA OF THE BREAST: RESECTION - All Specimens   8th Edition - Protocol posted: 12/13/2023     SPECIMEN     Procedure:    Excision (less than total mastectomy)      Specimen Laterality:    Left     TUMOR     Tumor Site:    Clock position      :    2 o'clock    Tumor Site:    Distance from nipple (Centimeters): 5 cm    Histologic Type:    Invasive carcinoma of no special type (ductal)    Histologic Grade ([place] Histologic Score):         Glandular (Acinar) / Tubular Differentiation:    Score 3      Nuclear Pleomorphism:    Score 3      Mitotic Rate:    Score 2      Overall  "Grade:    Grade 3 (scores of 8 or 9)    Tumor Size:    Greatest dimension of largest invasive focus (Millimeters): 21 mm      Additional Dimension (Millimeters):    20 mm      Additional Dimension (Millimeters):    16 mm    Tumor Focality:    Single focus of invasive carcinoma    Ductal Carcinoma In Situ (DCIS):    Present      :    Negative for extensive intraductal component (EIC)      Architectural Patterns:    Comedo      Architectural Patterns:    Cribriform      Architectural Patterns:    Solid      Nuclear Grade:    Grade III (high)      Necrosis:    Present, central (expansive \"comedo\" necrosis)    Lobular Carcinoma In Situ (LCIS):    Present    Lymphatic and / or Vascular Invasion:    Present      :    Focal    Dermal Lymphatic and / or Vascular Invasion:    No skin present    Microcalcifications:    Present in invasive carcinoma    Microcalcifications:    Present in non-neoplastic tissue    Treatment Effect in the Breast:    No known presurgical therapy     MARGINS    Margin Status for Invasive Carcinoma:    All margins negative for invasive carcinoma      Distance from Invasive Carcinoma to Closest Margin:    7 mm      Closest Margin(s) to Invasive Carcinoma:    Inferior      Distance from Invasive Carcinoma to Anterior Margin:    9 mm      Distance from Invasive Carcinoma to Posterior Margin:    10 mm      Distance from Invasive Carcinoma to Superior Margin:    19 mm      Distance from Invasive Carcinoma to Inferior Margin:    7 mm      Distance from Invasive Carcinoma to Medial Margin:    9 mm      Distance from Invasive Carcinoma to Lateral Margin:    10 mm    Margin Status for DCIS:    All margins negative for DCIS      Distance from DCIS to Closest Margin:    7 mm      Closest Margin(s) to DCIS:    Inferior     REGIONAL LYMPH NODES    Regional Lymph Node Status:         :    Tumor present in regional lymph node(s)        Number of Lymph Nodes with Macrometastases:    1        Number of Lymph Nodes " with Micrometastases:    0        Number of Lymph Nodes with Isolated Tumor Cells:    0        Size of Largest Nevaeh Metastatic Deposit:    2.1 mm        Extranodal Extension:    Not identified      Total Number of Lymph Nodes Examined (sentinel and non-sentinel):    5      Number of [place] Nodes Examined:    5     pTNM CLASSIFICATION (AJCC 8th Edition)      Reporting of pT, pN, and (when applicable) pM categories is based on information available to the pathologist at the time the report is issued. As per the AJCC (Chapter 1, 8th Ed.) it is the managing physician s responsibility to establish the final pathologic stage based upon all pertinent information, including but potentially not limited to this pathology report.    pT Category:    pT2    pN Category:    pN1a         Assessment    Ms. Starks is a 49 year old female with a diagnosis of invasive ductal carcinoma of the left breast status postlumpectomy and sentinel lymph node evaluation.  Final pathology demonstrated IDC, grade 3, 21 mm, positive DCIS, positive LVSI (focal), negative margin, 1 out of 5 sentinel lymph node positive (2.1 mm, no JEN), ER positive, MO positive, HER2 negative, pathologic T2 N1, Oncotype Dx recurrence score equals 28.  Evaluate potential role for radiation therapy.    Plan   We recommend adjuvant radiation to the whole breast using high tangents. We discussed the role of radiation in the management of patients with positive sentinel lymph node biopsy who did not receive surgical management of their axilla. Long-term follow-up from ACOSOG Z11 ([facility] 2016), a trial comparing axillary node dissection vs no dissection in patients receiving lumpectomy with 1-3 positive sentinel nodes, such as this patient, revealed no difference in locoregional recurrence at 10 years (6.2% v 5.3%, p=0.36). All patients in this study had lumpectomy followed by radiation. Of note, per-protocol radiation was only to the whole breast. We also discussed  potential treatment fields. There is controversy over which fields to treat in patients with positive sentinel lymph node biopsy, and the recommendation is to individualize treatment for each patient. In this patient's case,  we propose to treat with tangents alone, without addition of a third field for SCV coverage.    We recommend hypofractionated radiation therapy based on results from the [place] hypofractionation trial (Mary Jane et al, NEJM, 2010) and the recent update of the [place] START trial (Royce et al, Lancet Oncol, 2013), which both show equivalent local control, survival and cosmesis with these shorter treatment schedules as compared to conventional radiation fractionation. After our discussion, the patient is interested in proceeding with hypofractionated treatment to the whole breast. We will plan to treat to 4005 cGy in 15 fractions with a 1000 cGy in 5 fraction boost.    We also discussed the logistics of treatment planning and delivery in detail with the patient. We furthermore discussed side effects, including short term risks of fatigue and skin reaction, and long term risks of pneumonitis, lung fibrosis, soft tissue fibrosis, skin changes, breast contour changes, rib fractures, cardiac damage, secondary cancers, lymphedema, and thyroid dysfunction.  We described the use of 3D-conformal radiotherapy to minimize dose to the normal tissues, while adequately covering the target tissues, and the ability of this technique to decrease potential for toxicity.    The patient agrees with the current plan.  She was to proceed with radiation.  Will schedule the patient for CT simulation.  Consent obtained.    60 minutes spent on the date of the encounter doing chart review, review of outside records, review of test results, interpretation of tests, patient visit, documentation, discussion, and plan.      Yariel Torres MD  Department of Radiation Oncology  [facility]       Again, thank you for  allowing me to participate in the care of your patient.        Sincerely,        Yariel Torres MD    Electronically signed

## 2025-02-04 NOTE — NURSING NOTE
REASON FOR APPOINTMENT   Newly diagnosed left breast cancer, s/p lumpectomy   Last chemo 1/8/25 with Dr. Villatoro  Here to discuss radiation therapy    PERSONAL HISTORY OF CANCER   Previous Cancer ? 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 no   Prior Chemotherapy ? no   Prior Hormonal Therapy ? no     REFERRALS NEEDED  Sent to social work for support - referral placed    VITALS  BP (!) 187/79 (BP Location: Right arm, Patient Position: Sitting, Cuff Size: Adult Regular)   Pulse 94   Resp 16   Wt 120.3 kg (265 lb 3.2 oz)   SpO2 92%   BMI 51.79 kg/m      PACEMAKER/IMPLANTED CARDIAC DEVICE : No    PAIN  Occasional joint discomfort - no meds needed    PSYCHOSOCIAL  Marital Status:   Patient lives in [place], [facility] [place] [facility] [place]   Number of children: 5  Working status: not currently working   Do you feel safe in your home? Yes    REVIEW OF SYSTEMS  Skin: negative  Eyes: glasses  Ears/Nose/Throat: negative  Respiratory: No shortness of breath, dyspnea on exertion, cough, or hemoptysis  Cardiovascular: negative  Gastrointestinal: negative  Genitourinary: negative  Musculoskeletal: joint pain  Neurologic: numbness or tingling of hands and numbness or tingling of feet  Psychiatric: negative  Hematologic/Lymphatic/Immunologic: negative  Endocrine: thyroid disorder and diabetes    WOMEN ONLY  Any chance you may be pregnant: No,  and last period several years ago    Radiation Oncology Patient Teaching    Current Concern: none     Person involved with teaching: Patient  Patient asked Questions: Yes  Patient was cooperative: Yes  Patient was receptive (willing to accept information given): Yes    Education Assessment  Comprehension ability: High  Knowledge level: Medium  Factors affecting teaching: None    Response To Teaching  Verbalizes understanding    Do you have an advanced directive or living will? No  Are you DNR/DNI? No

## 2025-02-05 ENCOUNTER — PATIENT OUTREACH (OUTPATIENT)
Dept: CARE COORDINATION | Facility: CLINIC | Age: 50
End: 2025-02-05
Payer: COMMERCIAL

## 2025-02-05 NOTE — PROGRESS NOTES
Social Work - Telephone/MyChart message  [facility]  Data:   Patient Name: Nila Starks  Goes By: Nila    DAMON/Age: 1975 (49 year old)      Referral Source: GERONIMO Alvarado    Reason for Referral: New dx, resources     Intervention: SW left vm with contact information and availability. Offered to meet tomorrow in person after appt.    Plan:  will await patient's return phone call/message and provide assistance at that time.      LAVELLE Paez, [facility]  Adult Oncology Clinics  [place] (M,W), [place] (T) & [place] (Th)  *I am off Friday  Office: [phone]

## 2025-02-06 ENCOUNTER — PATIENT OUTREACH (OUTPATIENT)
Dept: CARE COORDINATION | Facility: CLINIC | Age: 50
End: 2025-02-06
Payer: COMMERCIAL

## 2025-02-06 ENCOUNTER — OFFICE VISIT (OUTPATIENT)
Dept: RADIATION THERAPY | Facility: OUTPATIENT CENTER | Age: 50
End: 2025-02-06
Payer: COMMERCIAL

## 2025-02-06 DIAGNOSIS — C50.912 MALIGNANT NEOPLASM OF LEFT BREAST IN FEMALE, ESTROGEN RECEPTOR POSITIVE, UNSPECIFIED SITE OF BREAST (H): Primary | ICD-10-CM

## 2025-02-06 DIAGNOSIS — Z17.0 MALIGNANT NEOPLASM OF LEFT BREAST IN FEMALE, ESTROGEN RECEPTOR POSITIVE, UNSPECIFIED SITE OF BREAST (H): Primary | ICD-10-CM

## 2025-02-06 NOTE — LETTER
2/6/2025      Nila Starks  [address]  [place] [address]      Dear Colleague,    Thank you for referring your patient, Nila Starks, to the [facility] RADIATION THERAPY CLINIC. Please see a copy of my visit note below.    The patient underwent CT simulation.     Yariel Torres M.D.  Department of Radiation Oncology  [facility]       Again, thank you for allowing me to participate in the care of your patient.        Sincerely,        Yariel Torres MD    Electronically signed

## 2025-02-06 NOTE — PROGRESS NOTES
Social Work - Intervention  [facility]  Data/Intervention:    Patient Name: Nila Starks Goes By: Nila    /Age: 1975 (49 year old)     Visit Type: in person  Referral Source: GERONIMO Alvarado  Reason for Referral: Resources/emotional support      Psychosocial Information/Concerns:  Nila is a 49 year old with a diagnosis of breast cancer. She was diagnosed in 2024. She had a lumpectomy. She completed chemo in 2025. She lost her job and is on unemployment. She has financial stressors. She has applied for several breast grants in the past. She needs help with the [employer] paperwork.      Intervention/Education/Resources Provided:  GUSTAVO met in person with Nila after her radiation appointment. She is concerned about her finances. She should receive help from [employer] which will help her back on her feet. GUSTAVO completed medical form and sent to her per her request. She has good support from her kids. She also sees a therapist regularly. She feels she is coping better now than she was at the beginning. Active listening, validation of feelings and emotional support provided.      Assessment/Plan:  Provided patient/family with contact information and availability.    LAVELLE Paez, [facility]  Adult Oncology Clinics  [place] (M,W), [place] (T) & [place] ()  *I am off Friday  Office: [phone]

## 2025-02-06 NOTE — PROGRESS NOTES
The patient underwent CT simulation.     Yariel Torres M.D.  Department of Radiation Oncology  [facility]

## 2025-02-19 ENCOUNTER — OFFICE VISIT (OUTPATIENT)
Dept: RADIATION THERAPY | Facility: OUTPATIENT CENTER | Age: 50
End: 2025-02-19
Payer: COMMERCIAL

## 2025-02-19 VITALS
HEART RATE: 95 BPM | OXYGEN SATURATION: 93 % | BODY MASS INDEX: 50.58 KG/M2 | SYSTOLIC BLOOD PRESSURE: 145 MMHG | WEIGHT: 259 LBS | DIASTOLIC BLOOD PRESSURE: 81 MMHG

## 2025-02-19 DIAGNOSIS — C50.912 MALIGNANT NEOPLASM OF LEFT BREAST IN FEMALE, ESTROGEN RECEPTOR POSITIVE, UNSPECIFIED SITE OF BREAST (H): Primary | ICD-10-CM

## 2025-02-19 DIAGNOSIS — Z17.0 MALIGNANT NEOPLASM OF LEFT BREAST IN FEMALE, ESTROGEN RECEPTOR POSITIVE, UNSPECIFIED SITE OF BREAST (H): Primary | ICD-10-CM

## 2025-02-19 NOTE — LETTER
2025      Nila Starks  [address]  [place] [address]      Dear Colleague,    Thank you for referring your patient, Nila Starks, to the  [facility]. Please see a copy of my visit note below.    [facility]  SPECIALIZING IN BREAKTHROUGHS  Radiation Oncology    On Treatment Visit Note      Nila Starks      Date: 2025   MRN: [id number]   : 1975  Diagnosis: Breast Ca      Reason for Visit:  On Radiation Treatment Visit     Treatment Summary to Date  Treatment Site: L breast Current Dose: 801/5005 cGy Fractions: 3/20      Chemotherapy  Chemo concurrent with radx?: No    Subjective:   Doing okay.  No acute complaints.  Energy is okay.  Applying skin care.  No pruritus.  No pain.  Having some nausea.  Has antiemetic at home.    Nursing ROS:   Nutrition Alteration  Diet Type: Patient's Preference  Skin  Skin Reaction: 0 - No changes  Skin Note: reviewed skin care        Cardiovascular  Respiratory effort: 1 - Normal - without distress  Gastrointestinal  Nausea: 1 - One to two episodes of nausea/24        Pain Assessment  0-10 Pain Scale: 0      Objective:   BP (!) 145/81 (BP Location: Right arm, Patient Position: Sitting, Cuff Size: Adult Regular)   Pulse 95   Wt 117.5 kg (259 lb)   SpO2 93%   BMI 50.58 kg/m    Skin without erythema or desquamation    Labs:  CBC RESULTS:   Recent Labs   Lab Test 20  1611   WBC 12.2*   RBC 4.92   HGB 12.6   HCT 38.8   MCV 79   MCH 25.6*   MCHC 32.5   RDW 18.1*        ELECTROLYTES:  Recent Labs   Lab Test 20  1017      POTASSIUM 3.3*   CHLORIDE 105   MIKAELA 8.3*   CO2 27   BUN 12   CR 0.72   *       Assessment:  Ms. Starks is a 49 year old female with a diagnosis of invasive ductal carcinoma of the left breast status postlumpectomy and sentinel lymph node evaluation.  Final pathology demonstrated IDC, grade 3, 21 mm, positive DCIS, positive LVSI (focal), negative margin, 1 out of 5  sentinel lymph node positive (2.1 mm, no JEN), ER positive, LA positive, HER2 negative, pathologic T2 N1, Oncotype Dx recurrence score equals 28.  She is undergoing adjuvant radiation.    Tolerating radiation therapy well.  All questions and concerns addressed.    Plan:   Continue current therapy.    Continue skin care.      Mosaiq chart and setup information reviewed  Ports checked         Educational Topic Discussed  Education Instructions: skin care reviewed      Yariel Torres MD          Again, thank you for allowing me to participate in the care of your patient.        Sincerely,        Yariel Torres MD    Electronically signed

## 2025-02-19 NOTE — PROGRESS NOTES
[facility]  SPECIALIZING IN BREAKTHROUGHS  Radiation Oncology    On Treatment Visit Note      Nila Starks      Date: 2025   MRN: [id number]   : 1975  Diagnosis: Breast Ca      Reason for Visit:  On Radiation Treatment Visit     Treatment Summary to Date  Treatment Site: L breast Current Dose: 801/5005 cGy Fractions: 3/20      Chemotherapy  Chemo concurrent with radx?: No    Subjective:   Doing okay.  No acute complaints.  Energy is okay.  Applying skin care.  No pruritus.  No pain.  Having some nausea.  Has antiemetic at home.    Nursing ROS:   Nutrition Alteration  Diet Type: Patient's Preference  Skin  Skin Reaction: 0 - No changes  Skin Note: reviewed skin care        Cardiovascular  Respiratory effort: 1 - Normal - without distress  Gastrointestinal  Nausea: 1 - One to two episodes of nausea/24        Pain Assessment  0-10 Pain Scale: 0      Objective:   BP (!) 145/81 (BP Location: Right arm, Patient Position: Sitting, Cuff Size: Adult Regular)   Pulse 95   Wt 117.5 kg (259 lb)   SpO2 93%   BMI 50.58 kg/m    Skin without erythema or desquamation    Labs:  CBC RESULTS:   Recent Labs   Lab Test 20  1611   WBC 12.2*   RBC 4.92   HGB 12.6   HCT 38.8   MCV 79   MCH 25.6*   MCHC 32.5   RDW 18.1*        ELECTROLYTES:  Recent Labs   Lab Test 20  1017      POTASSIUM 3.3*   CHLORIDE 105   MIKAELA 8.3*   CO2 27   BUN 12   CR 0.72   *       Assessment:  Ms. Starks is a 49 year old female with a diagnosis of invasive ductal carcinoma of the left breast status postlumpectomy and sentinel lymph node evaluation.  Final pathology demonstrated IDC, grade 3, 21 mm, positive DCIS, positive LVSI (focal), negative margin, 1 out of 5 sentinel lymph node positive (2.1 mm, no JEN), ER positive, AR positive, HER2 negative, pathologic T2 N1, Oncotype Dx recurrence score equals 28.  She is undergoing adjuvant radiation.    Tolerating radiation therapy well.  All questions and  concerns addressed.    Plan:   Continue current therapy.    Continue skin care.      Mosaiq chart and setup information reviewed  Ports checked         Educational Topic Discussed  Education Instructions: skin care reviewed      Yariel Torres MD

## 2025-02-20 ENCOUNTER — PATIENT OUTREACH (OUTPATIENT)
Dept: CARE COORDINATION | Facility: CLINIC | Age: 50
End: 2025-02-20
Payer: COMMERCIAL

## 2025-02-20 NOTE — PROGRESS NOTES
Social Work - Follow-Up  [facility]    Data/Intervention:    Patient Name: Nila Starks Goes By: Nila JOSE/Age: 1975 (49 year old)    Reason for Follow-Up:  Resources    Collaborated With:    -pt  -GERONIMO Martinez    Intervention/Education/Resources Provided:  Sw received message from Michelle that pt may need some additional resources. SW planned to visit with pt in person today after her treatment, but was not able to do so. SW will call patient to assess needs and offer to meet with her next week () after radiation tx.     Assessment/Plan:  Previously provided patient/family with writer's contact information and availability.      LAVELLE Paez, [facility]  Adult Oncology Clinics  [place] (M,W), [place] (T) & [place] ()  *I am off Friday  Office: [phone]

## 2025-02-26 ENCOUNTER — OFFICE VISIT (OUTPATIENT)
Dept: RADIATION THERAPY | Facility: OUTPATIENT CENTER | Age: 50
End: 2025-02-26
Payer: COMMERCIAL

## 2025-02-26 VITALS
DIASTOLIC BLOOD PRESSURE: 80 MMHG | BODY MASS INDEX: 50.19 KG/M2 | HEART RATE: 70 BPM | WEIGHT: 257 LBS | SYSTOLIC BLOOD PRESSURE: 151 MMHG

## 2025-02-26 DIAGNOSIS — Z17.0 MALIGNANT NEOPLASM OF LEFT BREAST IN FEMALE, ESTROGEN RECEPTOR POSITIVE, UNSPECIFIED SITE OF BREAST (H): Primary | ICD-10-CM

## 2025-02-26 DIAGNOSIS — C50.912 MALIGNANT NEOPLASM OF LEFT BREAST IN FEMALE, ESTROGEN RECEPTOR POSITIVE, UNSPECIFIED SITE OF BREAST (H): Primary | ICD-10-CM

## 2025-02-26 NOTE — PROGRESS NOTES
[facility]  SPECIALIZING IN BREAKTHROUGHS  Radiation Oncology    On Treatment Visit Note      Nila Starks      Date: 2025   MRN: [id number]   : 1975  Diagnosis: Breast Ca      Reason for Visit:  On Radiation Treatment Visit     Treatment Summary to Date  Treatment Site: L breast Current Dose:1869/5005 cGy Fractions:       Chemotherapy  Chemo concurrent with radx?: No    Subjective:   Doing okay.  No acute complaints.  Energy is okay.  Applying skin care.  No pruritus.  No pain.  Having some nausea.  Has antiemetic at home.    Nursing ROS:   Nutrition Alteration  Diet Type: Patient's Preference  Skin  Skin Reaction: 0 - No changes  Skin Note: reviewed skin care        Cardiovascular  Respiratory effort: 1 - Normal - without distress  Gastrointestinal  Nausea: 1 - One to two episodes of nausea/24        Pain Assessment  0-10 Pain Scale: 0      Objective:   BP (!) 151/80   Pulse 70   Wt 116.6 kg (257 lb)   BMI 50.19 kg/m    Skin with mild erythema without desquamation    Labs:  CBC RESULTS:   Recent Labs   Lab Test 20  1611   WBC 12.2*   RBC 4.92   HGB 12.6   HCT 38.8   MCV 79   MCH 25.6*   MCHC 32.5   RDW 18.1*        ELECTROLYTES:  Recent Labs   Lab Test 20  1017      POTASSIUM 3.3*   CHLORIDE 105   MIKAELA 8.3*   CO2 27   BUN 12   CR 0.72   *       Assessment:  Ms. Starks is a 49 year old female with a diagnosis of invasive ductal carcinoma of the left breast status post lumpectomy and sentinel lymph node evaluation.  Final pathology demonstrated IDC, grade 3, 21 mm, positive DCIS, positive LVSI (focal), negative margin, 1 out of 5 sentinel lymph node positive (2.1 mm, no JEN), ER positive, MT positive, HER2 negative, pathologic T2 N1, Oncotype Dx recurrence score equals 28.  She is undergoing adjuvant radiation.    Tolerating radiation therapy well.  All questions and concerns addressed.    Plan:   Continue current therapy.    Continue skin  care.      Mosaiq chart and setup information reviewed  Ports checked         Educational Topic Discussed  Education Instructions: skin care reviewed      Yariel Torres MD

## 2025-02-26 NOTE — LETTER
2025      Nila Starks  [address]  [place] [address]      Dear Colleague,    Thank you for referring your patient, Nila Starks, to the  [facility]. Please see a copy of my visit note below.    [facility]  SPECIALIZING IN BREAKTHROUGHS  Radiation Oncology    On Treatment Visit Note      Nila Starks      Date: 2025   MRN: [id number]   : 1975  Diagnosis: Breast Ca      Reason for Visit:  On Radiation Treatment Visit     Treatment Summary to Date  Treatment Site: L breast Current Dose:1869/5005 cGy Fractions:       Chemotherapy  Chemo concurrent with radx?: No    Subjective:   Doing okay.  No acute complaints.  Energy is okay.  Applying skin care.  No pruritus.  No pain.  Having some nausea.  Has antiemetic at home.    Nursing ROS:   Nutrition Alteration  Diet Type: Patient's Preference  Skin  Skin Reaction: 0 - No changes  Skin Note: reviewed skin care        Cardiovascular  Respiratory effort: 1 - Normal - without distress  Gastrointestinal  Nausea: 1 - One to two episodes of nausea/24        Pain Assessment  0-10 Pain Scale: 0      Objective:   BP (!) 151/80   Pulse 70   Wt 116.6 kg (257 lb)   BMI 50.19 kg/m    Skin with mild erythema without desquamation    Labs:  CBC RESULTS:   Recent Labs   Lab Test 20  1611   WBC 12.2*   RBC 4.92   HGB 12.6   HCT 38.8   MCV 79   MCH 25.6*   MCHC 32.5   RDW 18.1*        ELECTROLYTES:  Recent Labs   Lab Test 20  1017      POTASSIUM 3.3*   CHLORIDE 105   MIKAELA 8.3*   CO2 27   BUN 12   CR 0.72   *       Assessment:  Ms. Starks is a 49 year old female with a diagnosis of invasive ductal carcinoma of the left breast status post lumpectomy and sentinel lymph node evaluation.  Final pathology demonstrated IDC, grade 3, 21 mm, positive DCIS, positive LVSI (focal), negative margin, 1 out of 5 sentinel lymph node positive (2.1 mm, no JEN), ER positive, NJ positive, HER2  negative, pathologic T2 N1, Oncotype Dx recurrence score equals 28.  She is undergoing adjuvant radiation.    Tolerating radiation therapy well.  All questions and concerns addressed.    Plan:   Continue current therapy.    Continue skin care.      Mosaiq chart and setup information reviewed  Ports checked         Educational Topic Discussed  Education Instructions: skin care reviewed      Yariel Torres MD          Again, thank you for allowing me to participate in the care of your patient.        Sincerely,        Yariel Torres MD    Electronically signed

## 2025-02-27 ENCOUNTER — PATIENT OUTREACH (OUTPATIENT)
Dept: CARE COORDINATION | Facility: CLINIC | Age: 50
End: 2025-02-27
Payer: COMMERCIAL

## 2025-02-27 NOTE — PROGRESS NOTES
Social Work - Follow-Up  [facility]    Data/Intervention:    Patient Name: Nila Starks Goes By: Nila    /Age: 1975 (49 year old)    Reason for Follow-Up:  In person visit     Collaborated With:    -pt  -[place] Rad Onc team    Intervention/Education/Resources Provided:  Nila asked to meet with GUSTAVO after her tx. She has been applying and interviewing for jobs, but is still unemployed. She is concerned about her finances. She has received breast cancer grants and [employer]. GUSTAVO will apply for rent assistance through [employer]. Asked Nila to send rent info to  as it's needed for application. Discussed possibility of free eye exam/care, but she wants to go to her eye dr. Encouraged her to connect with them re options. Active listening and emotional support provided.     Assessment/Plan:  Previously provided patient/family with writer's contact information and availability.      LAVELLE Paez, [facility]  Adult Oncology Clinics  [place] (M,W), [place] (T) & [place] ()  *I am off Friday  Office: [phone]     
TIA (transient ischemic attack)

## 2025-03-04 ENCOUNTER — PATIENT OUTREACH (OUTPATIENT)
Dept: CARE COORDINATION | Facility: CLINIC | Age: 50
End: 2025-03-04
Payer: COMMERCIAL

## 2025-03-05 ENCOUNTER — OFFICE VISIT (OUTPATIENT)
Dept: RADIATION THERAPY | Facility: OUTPATIENT CENTER | Age: 50
End: 2025-03-05
Payer: COMMERCIAL

## 2025-03-05 VITALS
OXYGEN SATURATION: 96 % | WEIGHT: 257 LBS | SYSTOLIC BLOOD PRESSURE: 136 MMHG | BODY MASS INDEX: 50.19 KG/M2 | RESPIRATION RATE: 18 BRPM | DIASTOLIC BLOOD PRESSURE: 85 MMHG | HEART RATE: 71 BPM

## 2025-03-05 DIAGNOSIS — C50.912 MALIGNANT NEOPLASM OF LEFT BREAST IN FEMALE, ESTROGEN RECEPTOR POSITIVE, UNSPECIFIED SITE OF BREAST (H): Primary | ICD-10-CM

## 2025-03-05 DIAGNOSIS — Z17.0 MALIGNANT NEOPLASM OF LEFT BREAST IN FEMALE, ESTROGEN RECEPTOR POSITIVE, UNSPECIFIED SITE OF BREAST (H): Primary | ICD-10-CM

## 2025-03-05 NOTE — PROGRESS NOTES
[facility]  SPECIALIZING IN BREAKTHROUGHS  Radiation Oncology    On Treatment Visit Note      Nila Starks      Date: 3/5/2025   MRN: [id number]   : 1975  Diagnosis: Breast Ca      Reason for Visit:  On Radiation Treatment Visit     Treatment Summary to Date  Treatment Site: L breast Current Dose:2937/5005 cGy Fractions:       Chemotherapy  Chemo concurrent with radx?: No    Subjective:   Doing okay.  No acute complaints.  Energy is okay.  Applying skin care.  No pruritus.  No pain.  Having some nausea.  Has antiemetic at home.    Nursing ROS:   Nutrition Alteration  Diet Type: Patient's Preference  Skin  Skin Reaction: 0 - No changes  Skin Note: reviewed skin care        Cardiovascular  Respiratory effort: 1 - Normal - without distress  Gastrointestinal  Nausea: 1 - One to two episodes of nausea/24        Pain Assessment  0-10 Pain Scale: 0      Objective:   There were no vitals taken for this visit.  Skin with moderate erythema without desquamation    Labs:  CBC RESULTS:   Recent Labs   Lab Test 20  1611   WBC 12.2*   RBC 4.92   HGB 12.6   HCT 38.8   MCV 79   MCH 25.6*   MCHC 32.5   RDW 18.1*        ELECTROLYTES:  Recent Labs   Lab Test 20  1017      POTASSIUM 3.3*   CHLORIDE 105   MIKAELA 8.3*   CO2 27   BUN 12   CR 0.72   *       Assessment:  Ms. Starks is a 49 year old female with a diagnosis of invasive ductal carcinoma of the left breast status post lumpectomy and sentinel lymph node evaluation.  Final pathology demonstrated IDC, grade 3, 21 mm, positive DCIS, positive LVSI (focal), negative margin, 1 out of 5 sentinel lymph node positive (2.1 mm, no JEN), ER positive, ND positive, HER2 negative, pathologic T2 N1, Oncotype Dx recurrence score equals 28.  She is undergoing adjuvant radiation.    Tolerating radiation therapy well.  All questions and concerns addressed.    Plan:   Continue current therapy.    Continue skin care.      Mosaiq chart and setup  information reviewed  Ports checked         Educational Topic Discussed  Education Instructions: skin care reviewed      Yariel Torres MD

## 2025-03-05 NOTE — PROGRESS NOTES
Social Work - Follow-Up  [facility]    Data/Intervention:    Patient Name: Nila Starks Goes By: Nila    /Age: 1975 (49 year old)    Reason for Follow-Up:  [place]    Collaborated With:    -pt  -Anh Dias, Accommodations    Intervention/Education/Resources Provided:  Nila is a 49 year old with breast cancer who lost her job during her cancer tx. She expressed concerns to [facility] finances.  applied for rental assistance through [employer] funds.  will update Nila re approval of funding.     Assessment/Plan:  Previously provided patient/family with writer's contact information and availability.      LAVELLE Paez, [facility]  Adult Oncology Clinics  [place] (M,W), [place] (T) & [place] ()  *I am off Friday  Office: [phone]

## 2025-03-05 NOTE — LETTER
3/5/2025      Nila Starks  [address]  [place] [address]      Dear Colleague,    Thank you for referring your patient, Nila Starks, to the  [facility]. Please see a copy of my visit note below.    [facility]  SPECIALIZING IN BREAKTHROUGHS  Radiation Oncology    On Treatment Visit Note      Nila Starks      Date: 3/5/2025   MRN: [id number]   : 1975  Diagnosis: Breast Ca      Reason for Visit:  On Radiation Treatment Visit     Treatment Summary to Date  Treatment Site: L breast Current Dose:2937/5005 cGy Fractions:       Chemotherapy  Chemo concurrent with radx?: No    Subjective:   Doing okay.  No acute complaints.  Energy is okay.  Applying skin care.  No pruritus.  No pain.  Having some nausea.  Has antiemetic at home.    Nursing ROS:   Nutrition Alteration  Diet Type: Patient's Preference  Skin  Skin Reaction: 0 - No changes  Skin Note: reviewed skin care        Cardiovascular  Respiratory effort: 1 - Normal - without distress  Gastrointestinal  Nausea: 1 - One to two episodes of nausea/24        Pain Assessment  0-10 Pain Scale: 0      Objective:   There were no vitals taken for this visit.  Skin with moderate erythema without desquamation    Labs:  CBC RESULTS:   Recent Labs   Lab Test 20  1611   WBC 12.2*   RBC 4.92   HGB 12.6   HCT 38.8   MCV 79   MCH 25.6*   MCHC 32.5   RDW 18.1*        ELECTROLYTES:  Recent Labs   Lab Test 20  1017      POTASSIUM 3.3*   CHLORIDE 105   MIKAELA 8.3*   CO2 27   BUN 12   CR 0.72   *       Assessment:  Ms. Starks is a 49 year old female with a diagnosis of invasive ductal carcinoma of the left breast status post lumpectomy and sentinel lymph node evaluation.  Final pathology demonstrated IDC, grade 3, 21 mm, positive DCIS, positive LVSI (focal), negative margin, 1 out of 5 sentinel lymph node positive (2.1 mm, no JEN), ER positive, ME positive, HER2 negative, pathologic T2 N1,  Oncotype Dx recurrence score equals 28.  She is undergoing adjuvant radiation.    Tolerating radiation therapy well.  All questions and concerns addressed.    Plan:   Continue current therapy.    Continue skin care.      Mosaiq chart and setup information reviewed  Ports checked         Educational Topic Discussed  Education Instructions: skin care reviewed      Yariel Torres MD          Again, thank you for allowing me to participate in the care of your patient.        Sincerely,        Yariel Torres MD    Electronically signed

## 2025-03-12 ENCOUNTER — OFFICE VISIT (OUTPATIENT)
Dept: RADIATION THERAPY | Facility: OUTPATIENT CENTER | Age: 50
End: 2025-03-12
Payer: COMMERCIAL

## 2025-03-12 VITALS
DIASTOLIC BLOOD PRESSURE: 86 MMHG | OXYGEN SATURATION: 95 % | HEART RATE: 77 BPM | SYSTOLIC BLOOD PRESSURE: 139 MMHG | RESPIRATION RATE: 18 BRPM

## 2025-03-12 DIAGNOSIS — Z17.0 MALIGNANT NEOPLASM OF LEFT BREAST IN FEMALE, ESTROGEN RECEPTOR POSITIVE, UNSPECIFIED SITE OF BREAST (H): Primary | ICD-10-CM

## 2025-03-12 DIAGNOSIS — C50.912 MALIGNANT NEOPLASM OF LEFT BREAST IN FEMALE, ESTROGEN RECEPTOR POSITIVE, UNSPECIFIED SITE OF BREAST (H): Primary | ICD-10-CM

## 2025-03-12 NOTE — LETTER
3/12/2025      Nila Starks  [address]  [place] [address]      Dear Colleague,    Thank you for referring your patient, Nila Starks, to the  [facility]. Please see a copy of my visit note below.    [facility]  SPECIALIZING IN BREAKTHROUGHS  Radiation Oncology    On Treatment Visit Note      Nila Starks      Date: 3/12/2025   MRN: [id number]   : 1975  Diagnosis: Breast Ca      Reason for Visit:  On Radiation Treatment Visit     Treatment Summary to Date  Treatment Site: L breast Current Dose:4205/5005 cGy Fractions:       Chemotherapy  Chemo concurrent with radx?: No    Subjective:   Doing okay.  No acute complaints.  Energy is okay.  Applying skin care.  No pruritus.  No pain.      Nursing ROS:   Nutrition Alteration  Diet Type: Patient's Preference  Skin  Skin Reaction: 0 - No changes  Skin Note: reviewed skin care        Cardiovascular  Respiratory effort: 1 - Normal - without distress  Gastrointestinal  Nausea: 1 - One to two episodes of nausea/24        Pain Assessment  0-10 Pain Scale: 0      Objective:   There were no vitals taken for this visit.  Skin with moderate erythema without desquamation    Labs:  CBC RESULTS:   Recent Labs   Lab Test 20  1611   WBC 12.2*   RBC 4.92   HGB 12.6   HCT 38.8   MCV 79   MCH 25.6*   MCHC 32.5   RDW 18.1*        ELECTROLYTES:  Recent Labs   Lab Test 20  1017      POTASSIUM 3.3*   CHLORIDE 105   MIKAELA 8.3*   CO2 27   BUN 12   CR 0.72   *       Assessment:  Ms. Starks is a 49 year old female with a diagnosis of invasive ductal carcinoma of the left breast status post lumpectomy and sentinel lymph node evaluation.  Final pathology demonstrated IDC, grade 3, 21 mm, positive DCIS, positive LVSI (focal), negative margin, 1 out of 5 sentinel lymph node positive (2.1 mm, no JEN), ER positive, SC positive, HER2 negative, pathologic T2 N1, Oncotype Dx recurrence score equals 28.  She  is undergoing adjuvant radiation.    Tolerating radiation therapy well.  All questions and concerns addressed.    Plan:   Continue current therapy.  EOT on Tuesday. RTC in 1 month, will see PA.  Continue skin care.      M[employer] chart and setup information reviewed  Ports checked         Educational Topic Discussed  Education Instructions: skin care reviewed      Yariel Torres MD          Again, thank you for allowing me to participate in the care of your patient.        Sincerely,        Yariel Torres MD    Electronically signed

## 2025-03-12 NOTE — PROGRESS NOTES
[facility]  SPECIALIZING IN BREAKTHROUGHS  Radiation Oncology    On Treatment Visit Note      Nila Starks      Date: 3/12/2025   MRN: [id number]   : 1975  Diagnosis: Breast Ca      Reason for Visit:  On Radiation Treatment Visit     Treatment Summary to Date  Treatment Site: L breast Current Dose:4205/5005 cGy Fractions:       Chemotherapy  Chemo concurrent with radx?: No    Subjective:   Doing okay.  No acute complaints.  Energy is okay.  Applying skin care.  No pruritus.  No pain.      Nursing ROS:   Nutrition Alteration  Diet Type: Patient's Preference  Skin  Skin Reaction: 0 - No changes  Skin Note: reviewed skin care        Cardiovascular  Respiratory effort: 1 - Normal - without distress  Gastrointestinal  Nausea: 1 - One to two episodes of nausea/24        Pain Assessment  0-10 Pain Scale: 0      Objective:   There were no vitals taken for this visit.  Skin with moderate erythema without desquamation    Labs:  CBC RESULTS:   Recent Labs   Lab Test 20  1611   WBC 12.2*   RBC 4.92   HGB 12.6   HCT 38.8   MCV 79   MCH 25.6*   MCHC 32.5   RDW 18.1*        ELECTROLYTES:  Recent Labs   Lab Test 20  1017      POTASSIUM 3.3*   CHLORIDE 105   MIKAELA 8.3*   CO2 27   BUN 12   CR 0.72   *       Assessment:  Ms. Starks is a 49 year old female with a diagnosis of invasive ductal carcinoma of the left breast status post lumpectomy and sentinel lymph node evaluation.  Final pathology demonstrated IDC, grade 3, 21 mm, positive DCIS, positive LVSI (focal), negative margin, 1 out of 5 sentinel lymph node positive (2.1 mm, no JEN), ER positive, VT positive, HER2 negative, pathologic T2 N1, Oncotype Dx recurrence score equals 28.  She is undergoing adjuvant radiation.    Tolerating radiation therapy well.  All questions and concerns addressed.    Plan:   Continue current therapy.  EOT on Tuesday. RTC in 1 month, will see PA.  Continue skin care.      [email or web address] chart and setup  information reviewed  Ports checked         Educational Topic Discussed  Education Instructions: skin care reviewed      Yariel Torres MD

## 2025-03-18 ENCOUNTER — DOCUMENTATION ONLY (OUTPATIENT)
Dept: RADIATION THERAPY | Facility: OUTPATIENT CENTER | Age: 50
End: 2025-03-18

## 2025-03-18 DIAGNOSIS — L58.9 RADIATION DERMATITIS: Primary | ICD-10-CM

## 2025-03-18 RX ORDER — SILVER SULFADIAZINE 10 MG/G
CREAM TOPICAL 2 TIMES DAILY
Qty: 85 G | Refills: 2 | Status: SHIPPED | OUTPATIENT
Start: 2025-03-18

## 2025-03-18 NOTE — PROGRESS NOTES
Radiotherapy Treatment Summary              PATIENT: Nila Starks  MEDICAL RECORD NO: [id number]   : 1975    DIAGNOSIS:  Invasive ductal carcinoma of the left breast   INTENT OF RADIOTHERAPY: Adjuvant  PATHOLOGY: 9/10/24 Lumpectomy and sentinel lymph node evaluation. Final pathology demonstrated IDC, grade 3, 21 mm, positive DCIS, positive LVSI (focal), negative margin, 1 out of 5 sentinel lymph node positive (2.1 mm, no JEN), ER positive, IA positive, HER2 negative, Oncotype Dx recurrence score equals 28.                                 STAGE: pathologic T2 N1  CONCURRENT SYSTEMIC THERAPY:  No       ONCOLOGIC HISTORY:          Ms. Starks is a 49 year old female with a diagnosis of left breast cancer.     The patient presented with bilateral screening mammogram in 2024 which demonstrated a 2 cm mass in the left breast.  There is an additional mass measuring 8 mm which is possibly represented a small lymph node.  On 2024 ultrasound-guided biopsy of the left breast mass at 2 o'clock position, 5 cm from the nipple demonstrated invasive ductal carcinoma, grade 3, ER positive, IA positive, HER2 negative.  Ultrasound-guided biopsy of the additional noted mass demonstrated fragments of reactive lymph node, negative for metastatic carcinoma.  The patient underwent left breast lumpectomy and sentinel lymph node evaluation by Dr. Bettencourt on 9/10/2024.  Final pathology demonstrated IDC, grade 3, 21 mm, positive DCIS, positive LVSI (focal), negative margin, 1 out of 5 sentinel lymph node positive (2.1 mm, no JEN), ER positive, IA positive, HER2 negative, pathologic T2 N1.  Oncotype DX recurrence score equals 28.  The patient with started on systemic chemotherapy with Taxotere and Cytoxan under the care of of Dr. Villatoro.  The patient completed chemotherapy on 2025.       SITE OF TREATMENT: Left breast    DATES  OF TREATMENT: 25 to 3/18/25    TOTAL DOSE OF TREATMENT: 5, 005 cGy    DOSE PER FRACTION  OF TREATMENT:   267 cGy x 15 fractions  200 cGg x 5 fractions as a boost       COMMENT/TOXICITY: None                   PAIN MANAGEMENT:  None                           FOLLOW UP PLAN:One month    CC  Patient Care Team:  Vianey Sow MD as PCP - Meredith Campos MD (General Surgery)  Yariel Torres MD as MD (Radiation Oncology)  Yariel Torres MD as Assigned Cancer Care Provider       OCTAVAI Bennett  Department of Radiation Oncology  [facility]

## 2025-03-24 ENCOUNTER — PATIENT OUTREACH (OUTPATIENT)
Dept: CARE COORDINATION | Facility: CLINIC | Age: 50
End: 2025-03-24
Payer: COMMERCIAL

## 2025-03-24 NOTE — PROGRESS NOTES
Social Work - Telephone/MyChart message  [facility]  Data:   Patient Name: Nila Starks  Goes By: Nila    DAMON/Age: 1975 (49 year old)     Intervention: SW received message that pt's landlord received rent check. She also asked about resources for finding a job. SW gave information re [employer]. Encouraged her to reach out with any other questions/concerns.  Plan:  will await patient's return phone call/message and provide assistance at that time.   LAVELLE Paez, [facility]  Adult Oncology Clinics  [place] (M,W), [place] (T) & [place] (Th)  *I am off Friday  Office: [phone]

## 2025-04-16 ENCOUNTER — PATIENT OUTREACH (OUTPATIENT)
Dept: CARE COORDINATION | Facility: CLINIC | Age: 50
End: 2025-04-16
Payer: COMMERCIAL

## 2025-04-16 NOTE — PROGRESS NOTES
Social Work - Telephone/MyChart message  [facility]  Data:   Patient Name: Nila Starks  Goes By: Nila    /Age: 1975 (50 year old)    Intervention: Nila sent message to  that she has a job and will start in a few weeks. She was inquiring about additional rent assistance. Gave info for [employer] and [employer] (if she applied 1 yr ago). Also encouraged her to reach out to her county if she hasn't received assistance.    Plan:  remains available for resource coordination and emotional support.   LAVELLE Paez, [facility]  Adult Oncology Clinics  [place] (M,W), [place] (T) & [place] (Th)  *I am off Friday  Office: [phone]